# Patient Record
Sex: FEMALE | Race: WHITE | NOT HISPANIC OR LATINO | Employment: FULL TIME | URBAN - METROPOLITAN AREA
[De-identification: names, ages, dates, MRNs, and addresses within clinical notes are randomized per-mention and may not be internally consistent; named-entity substitution may affect disease eponyms.]

---

## 2017-01-13 ENCOUNTER — TELEPHONE (OUTPATIENT)
Dept: FAMILY MEDICINE | Facility: CLINIC | Age: 52
End: 2017-01-13

## 2017-01-13 NOTE — TELEPHONE ENCOUNTER
----- Message from Lesa Perez sent at 1/13/2017  9:15 AM CST -----  Contact: patient  Patient calling in regards to rescheduling her Nurse visit to check her blood pressure. She missed the appt on 1/4/17. She would like to come in on Monday after 10 am. Please advise.  Call back .  Thanks!

## 2017-01-16 ENCOUNTER — CLINICAL SUPPORT (OUTPATIENT)
Dept: FAMILY MEDICINE | Facility: CLINIC | Age: 52
End: 2017-01-16
Payer: COMMERCIAL

## 2017-01-16 ENCOUNTER — HOSPITAL ENCOUNTER (OUTPATIENT)
Dept: RADIOLOGY | Facility: CLINIC | Age: 52
Discharge: HOME | End: 2017-01-16
Attending: OBSTETRICS & GYNECOLOGY
Payer: COMMERCIAL

## 2017-01-16 ENCOUNTER — OFFICE VISIT (OUTPATIENT)
Dept: OBSTETRICS AND GYNECOLOGY | Facility: CLINIC | Age: 52
End: 2017-01-16
Payer: COMMERCIAL

## 2017-01-16 VITALS
HEIGHT: 59 IN | SYSTOLIC BLOOD PRESSURE: 126 MMHG | WEIGHT: 167.13 LBS | BODY MASS INDEX: 33.69 KG/M2 | DIASTOLIC BLOOD PRESSURE: 76 MMHG

## 2017-01-16 VITALS — SYSTOLIC BLOOD PRESSURE: 118 MMHG | DIASTOLIC BLOOD PRESSURE: 82 MMHG

## 2017-01-16 DIAGNOSIS — Z12.31 VISIT FOR SCREENING MAMMOGRAM: ICD-10-CM

## 2017-01-16 DIAGNOSIS — Z01.419 VISIT FOR GYNECOLOGIC EXAMINATION: ICD-10-CM

## 2017-01-16 DIAGNOSIS — Z12.31 VISIT FOR SCREENING MAMMOGRAM: Primary | ICD-10-CM

## 2017-01-16 DIAGNOSIS — I10 ESSENTIAL HYPERTENSION: Primary | ICD-10-CM

## 2017-01-16 PROCEDURE — 77067 SCR MAMMO BI INCL CAD: CPT | Mod: TC

## 2017-01-16 PROCEDURE — 77063 BREAST TOMOSYNTHESIS BI: CPT | Mod: 26,,, | Performed by: RADIOLOGY

## 2017-01-16 PROCEDURE — 3078F DIAST BP <80 MM HG: CPT | Mod: S$GLB,,, | Performed by: OBSTETRICS & GYNECOLOGY

## 2017-01-16 PROCEDURE — 99999 PR PBB SHADOW E&M-EST. PATIENT-LVL III: CPT | Mod: PBBFAC,,, | Performed by: OBSTETRICS & GYNECOLOGY

## 2017-01-16 PROCEDURE — 99499 UNLISTED E&M SERVICE: CPT | Mod: S$GLB,,, | Performed by: INTERNAL MEDICINE

## 2017-01-16 PROCEDURE — 77067 SCR MAMMO BI INCL CAD: CPT | Mod: 26,,, | Performed by: RADIOLOGY

## 2017-01-16 PROCEDURE — 3074F SYST BP LT 130 MM HG: CPT | Mod: S$GLB,,, | Performed by: OBSTETRICS & GYNECOLOGY

## 2017-01-16 PROCEDURE — 99396 PREV VISIT EST AGE 40-64: CPT | Mod: S$GLB,,, | Performed by: OBSTETRICS & GYNECOLOGY

## 2017-01-16 NOTE — PROGRESS NOTES
Chief Complaint   Patient presents with    Well Woman       History and Physical:  Patient's last menstrual period was 2013.       Aidee Larose is a 51 y.o.  female who presents today for her routine annual GYN exam. The patient has no Gynecology complaints today. S/p Total laparoscopic Hysterectomy with  bilateral salpingo-oophorectomy - no Vaginal Bleeding or dryness, reasured. No Hormone Replacement Therapy. No breast complaints.       Allergies: Review of patient's allergies indicates:  No Known Allergies    Past Medical History   Diagnosis Date    Hypertension     Left navicular fracture of foot     Miscarriage      x2 first trimester    PE (pulmonary embolism)      after a foot fracture in     Thyroid disease      hypothyroidism       Past Surgical History   Procedure Laterality Date    Tubal ligation       section      Pelvic laparoscopy      Laser ablation      Breast reduction      Hysterectomy  2013     SAMI BSO    Oophorectomy  2013     SAMI BSO    Belt abdominoplasty  2013    Carpal tunnel release Bilateral  and      Dr. Funes       MEDS:   Current Outpatient Prescriptions on File Prior to Visit   Medication Sig Dispense Refill    amlodipine (NORVASC) 5 MG tablet Take 1 tablet (5 mg total) by mouth once daily. 30 tablet 1    levothyroxine (SYNTHROID) 50 MCG tablet TAKE 1 TABLET (50 MCG TOTAL) BY MOUTH ONCE DAILY. 90 tablet 3    multivitamin (ONE DAILY MULTIVITAMIN) per tablet Take 1 tablet by mouth once daily.      valacyclovir (VALTREX) 1000 MG tablet Take 2 tablets (2,000 mg total) by mouth 2 (two) times daily as needed. 4 tablet 5     No current facility-administered medications on file prior to visit.        OB History      Para Term  AB TAB SAB Ectopic Multiple Living    6 3 3  3  3   3          Social History     Social History    Marital status:      Spouse name: N/A    Number of children: 3     "Years of education: N/A     Occupational History     [employer]     Social History Main Topics    Smoking status: Never Smoker    Smokeless tobacco: Never Used    Alcohol use No    Drug use: No    Sexual activity: Yes     Partners: Male     Birth control/ protection: None     Other Topics Concern    Not on file     Social History Narrative       Family History   Problem Relation Age of Onset    Kidney cancer Mother     Cancer Mother      renal cancer    Heart disease Mother      valvular heart disease (history of maureen fever)    Arthritis Sister      rheumatoid arthritis    Diabetes Brother     Hypertension Brother     Breast cancer Neg Hx     Ovarian cancer Neg Hx          Past medical and surgical history reviewed.   I have reviewed the patient's medical history in detail and updated the computerized patient record.        Review of System:   General: no chills, fever, night sweats, weight gain or weight loss  Psychological: no depression or suicidal ideation  Breasts: no new or changing breast lumps, nipple discharge or masses.  Respiratory: no cough, shortness of breath, or wheezing  Cardiovascular: no chest pain or dyspnea on exertion  Gastrointestinal: no abdominal pain, change in bowel habits, or black or bloody stools  Genito-Urinary: no incontinence, urinary frequency/urgency or vulvar/vaginal symptoms, pelvic pain or abnormal vaginal bleeding.  Musculoskeletal: no gait disturbance or muscular weakness      Physical Exam:     Visit Vitals    /76    Ht 4' 11" (1.499 m)    Wt 75.8 kg (167 lb 1.7 oz)    LMP 02/05/2013    BMI 33.75 kg/m2     Constitutional: She is oriented to person, place, and time. She appears well-developed and well-nourished. No distress.   HENT:   Head: Normocephalic and atraumatic.   Eyes: Conjunctivae and EOM are normal. No scleral icterus.   Neck: Normal range of motion. Neck supple. No tracheal deviation present.   Cardiovascular: Normal " rate.    Pulmonary/Chest: Effort normal. No respiratory distress. She exhibits no tenderness.  Breasts: are symmetrical. Well healed scars.    Right breast exhibits no inverted nipple, no mass, no nipple discharge, no skin change and no tenderness.   Left breast exhibits no inverted nipple, no mass, no nipple discharge, no skin change and no tenderness.  Abdominal: Soft. She exhibits no distension and no mass. There is no tenderness. There is no rebound and no guarding.   Genitourinary: deferred  Musculoskeletal: Normal range of motion.   Lymphadenopathy: No inguinal adenopathy present.   Neurological: She is alert and oriented to person, place, and time. Coordination normal.   Skin: Skin is warm and dry. She is not diaphoretic.   Psychiatric: She has a normal mood and affect.      Assessment:   Normal annual GYN exam  1. Visit for screening mammogram  CANCELED: Mammo Digital Screening Bilat With CAD       Plan:   PAP and pelvic not necessary unless having problems  Mammogram  Follow up in 1 year.

## 2017-01-16 NOTE — MR AVS SNAPSHOT
[facility] - OB/GYN  [address] [place] [address]  Phone: [phone]                  Aidee Larose   2017 9:00 AM   Office Visit    Description:  Female : 1965   Provider:  Ryan Hardy MD   Department:  [facility] - OB/GYN           Reason for Visit     Well Woman           Diagnoses this Visit        Comments    Visit for screening mammogram    -  Primary            To Do List           Future Appointments        Provider Department Dept Phone    2017 10:15 AM [facility], NURSE [place] - Family Medicine [phone]    2017 10:45 AM [facility] MAMMO1 [facility] - Mammography [phone]      Goals (5 Years of Data)     None      [facility]     [facility] -  Assistance  Registered nurses in the [facility] provide clinical advisement, health education, appointment booking, and other advisory services.  Call for this free service at [phone].             Medications           Message regarding Medications     Verify the changes and/or additions to your medication regime listed below are the same as discussed with your clinician today.  If any of these changes or additions are incorrect, please notify your healthcare provider.             Verify that the below list of medications is an accurate representation of the medications you are currently taking.  If none reported, the list may be blank. If incorrect, please contact your healthcare provider. Carry this list with you in case of emergency.           Current Medications     amlodipine (NORVASC) 5 MG tablet Take 1 tablet (5 mg total) by mouth once daily.    levothyroxine (SYNTHROID) 50 MCG tablet TAKE 1 TABLET (50 MCG TOTAL) BY MOUTH ONCE DAILY.    multivitamin (ONE DAILY MULTIVITAMIN) per tablet Take 1 tablet by mouth once daily.    valacyclovir (VALTREX) 1000 MG tablet Take 2 tablets (2,000 mg total) by mouth 2 (two) times daily as needed.           Clinical Reference  "Information           Vital Signs - Last Recorded  Most recent update: 1/16/2017  9:13 AM by Sri Joseph LPN    BP Ht Wt LMP BMI    126/76 4' 11" (1.499 m) 75.8 kg (167 lb 1.7 oz) 02/05/2013 33.75 kg/m2      Blood Pressure          Most Recent Value    BP  126/76      Allergies as of 1/16/2017     No Known Allergies      Immunizations Administered on Date of Encounter - 1/16/2017     None      Orders Placed During Today's Visit     Future Labs/Procedures Expected by Expires    Mammo Digital Screening Bilat With CAD  1/16/2017 3/16/2018      "

## 2017-01-16 NOTE — LETTER
January 16, 2017      Janet Hampton MD  [address] [facility] [address] [place] [address]           [facility] - OB/GYN  [address] [facility] [address]  Phone: [phone]          Patient: Aidee Larose   MR Number: [id number]   YOB: 1965   Date of Visit: 1/16/2017       Dear Dr. Janet Hampton:    Thank you for referring Aidee Larose to me for evaluation. Attached you will find relevant portions of my assessment and plan of care.    If you have questions, please do not hesitate to call me. I look forward to following Aidee Larose along with you.    Sincerely,    Ryan Hardy MD    Enclosure  CC:  No Recipients    If you would like to receive this communication electronically, please contact [email or web address] or [phone] to request more information on [employer] Link access.    For providers and/or their staff who would like to refer a patient to [facility], please contact us through our one-stop-shop provider referral line, [facility] [place], at [phone].    If you feel you have received this communication in error or would no longer like to receive these types of communications, please e-mail [email or web address]

## 2017-02-10 DIAGNOSIS — I10 ESSENTIAL HYPERTENSION: ICD-10-CM

## 2017-02-11 RX ORDER — AMLODIPINE BESYLATE 5 MG/1
TABLET ORAL
Qty: 30 TABLET | Refills: 10 | Status: SHIPPED | OUTPATIENT
Start: 2017-02-11 | End: 2018-02-03 | Stop reason: SDUPTHER

## 2017-06-01 ENCOUNTER — OFFICE VISIT (OUTPATIENT)
Dept: INTERNAL MEDICINE | Facility: CLINIC | Age: 52
End: 2017-06-01
Payer: COMMERCIAL

## 2017-06-01 VITALS
DIASTOLIC BLOOD PRESSURE: 86 MMHG | SYSTOLIC BLOOD PRESSURE: 114 MMHG | WEIGHT: 168 LBS | BODY MASS INDEX: 33.87 KG/M2 | HEIGHT: 59 IN | HEART RATE: 82 BPM | RESPIRATION RATE: 18 BRPM

## 2017-06-01 DIAGNOSIS — R07.9 CHEST PAIN, UNSPECIFIED TYPE: ICD-10-CM

## 2017-06-01 DIAGNOSIS — F41.8 SITUATIONAL ANXIETY: ICD-10-CM

## 2017-06-01 DIAGNOSIS — I10 ESSENTIAL HYPERTENSION: ICD-10-CM

## 2017-06-01 PROCEDURE — 99999 PR PBB SHADOW E&M-EST. PATIENT-LVL III: CPT | Mod: PBBFAC,,, | Performed by: INTERNAL MEDICINE

## 2017-06-01 PROCEDURE — 93010 ELECTROCARDIOGRAM REPORT: CPT | Mod: S$GLB,,, | Performed by: INTERNAL MEDICINE

## 2017-06-01 PROCEDURE — 93005 ELECTROCARDIOGRAM TRACING: CPT | Mod: S$GLB,,, | Performed by: INTERNAL MEDICINE

## 2017-06-01 PROCEDURE — 99213 OFFICE O/P EST LOW 20 MIN: CPT | Mod: S$GLB,,, | Performed by: INTERNAL MEDICINE

## 2017-06-01 RX ORDER — ESCITALOPRAM OXALATE 10 MG/1
10 TABLET ORAL DAILY
Qty: 30 TABLET | Refills: 5 | Status: SHIPPED | OUTPATIENT
Start: 2017-06-01 | End: 2018-01-15

## 2017-06-01 NOTE — PROGRESS NOTES
HISTORY OF PRESENT ILLNESS:  PtJoby is a 52 y.o. female presents with complaint of increased chest tightness, felt possibly due to increased stress in her life.  Her son was diagnosed with an illness 6 months ago.  Tightness was first noticed about that time.  Worse when dealing with his illness exacerbations.  Tightness lasts a minute or two, then eases off.  Occurs 3 times in the last 6 months.  She states there is some sweating with it.  Feels like a vise.  There is CAD early in the family, uncle had bypass in his 40s, mother had congenital valve.  B/P is fine.  Has not been on anything for anxiety.      ROS:  GENERAL: No fever, chills, fatigability or weight loss.  SKIN: No rashes, itching or changes in color or texture of skin.  HEAD: No headaches or recent head trauma.  EARS: Denies ear pain, discharge or vertigo.  NOSE: No loss of smell, no epistaxis or postnasal drip.  MOUTH & THROAT: No hoarseness or change in voice. No excessive gum bleeding.  NODES: Denies swollen glands.  CHEST: Denies MATA, cyanosis, wheezing, cough and sputum production.  CARDIOVASCULAR: Positive chest pain, PND, orthopnea or reduced exercise tolerance.  ABDOMEN: Appetite fine. No weight loss. Denies constipation, diarrhea, abdominal pain, hematemesis or blood in stool.  URINARY: No flank pain, dysuria or hematuria.  PERIPHERAL VASCULAR: No claudication or cyanosis. No edema.  MUSCULOSKELETAL: No joint stiffness or swelling. Denies back pain.  NEUROLOGIC: Denies numbness    PE:   Vitals:   Vitals:    06/01/17 1537   BP: 114/86   Pulse: 82   Resp: 18     GENERAL: no acute distress, A&Ox3, comfortable.  Female with BMI of 33   HEENT: tympanic membranes clear, nasal mucosa pink, no pharyngeal erythema or exudate  NECK: supple, no cervical lymphadenopathy, no thyromegaly; no supraclavicular nodes;   CHEST:  clear to auscultation bilaterally, no crackles or wheeze; no increased work of breathing;  CARDIOVASCULAR: regular rate and rhythm, no  rubs, murmurs or gallops.  ABDOMEN: normal bowel sounds, soft non-tender, non-distended; no palpable organomegaly;   EXT: no clubbing, cyanosis or edema.     EKG today is WNL;    ASSESSMENT/PLAN:    HTN: Continue amlodipine;    Chest pain, unspecified type  -     Exercise stress echo; Future  -     EKG 12-lead; Future    Situational anxiety  -     escitalopram oxalate (LEXAPRO) 10 MG tablet; Take 1 tablet (10 mg total) by mouth once daily.  Dispense: 30 tablet; Refill: 5      Call if condition changes or worsens.    Answers for HPI/ROS submitted by the patient on 5/31/2017   activity change: No  unexpected weight change: No  neck pain: No  hearing loss: No  rhinorrhea: No  trouble swallowing: No  eye discharge: No  visual disturbance: No  chest tightness: Yes  wheezing: No  chest pain: Yes  palpitations: No  blood in stool: No  constipation: No  vomiting: No  diarrhea: No  polydipsia: No  polyuria: No  difficulty urinating: No  hematuria: No  menstrual problem: No  dysuria: No  joint swelling: No  arthralgias: No  headaches: No  weakness: No  confusion: No  dysphoric mood: No

## 2017-06-02 ENCOUNTER — TELEPHONE (OUTPATIENT)
Dept: FAMILY MEDICINE | Facility: CLINIC | Age: 52
End: 2017-06-02

## 2017-06-02 DIAGNOSIS — R07.9 CHEST PAIN, UNSPECIFIED TYPE: Primary | ICD-10-CM

## 2017-06-02 NOTE — TELEPHONE ENCOUNTER
----- Message from Narda Roberson sent at 6/2/2017  8:54 AM CDT -----  Regarding: EKG ORDER  Please put in EKG order, thanks. Narda [phone]

## 2017-06-02 NOTE — TELEPHONE ENCOUNTER
----- Message from Narda Roberson sent at 6/2/2017  8:54 AM CDT -----  Regarding: EKG ORDER  Please put in EKG order, thanks. Narda [phone]

## 2017-06-03 PROBLEM — R07.9 CHEST PAIN: Status: ACTIVE | Noted: 2017-06-03

## 2017-06-03 PROBLEM — F41.8 SITUATIONAL ANXIETY: Status: ACTIVE | Noted: 2017-06-03

## 2017-06-07 ENCOUNTER — CLINICAL SUPPORT (OUTPATIENT)
Dept: CARDIOLOGY | Facility: CLINIC | Age: 52
End: 2017-06-07
Payer: COMMERCIAL

## 2017-06-07 DIAGNOSIS — R07.9 CHEST PAIN, UNSPECIFIED TYPE: ICD-10-CM

## 2017-06-07 LAB
DIASTOLIC DYSFUNCTION: NO
MITRAL VALVE MOBILITY: NORMAL
RETIRED EF AND QEF - SEE NOTES: 60 (ref 55–65)

## 2017-06-07 PROCEDURE — 93351 STRESS TTE COMPLETE: CPT | Mod: S$GLB,,, | Performed by: INTERNAL MEDICINE

## 2017-06-07 PROCEDURE — 93321 DOPPLER ECHO F-UP/LMTD STD: CPT | Mod: S$GLB,,, | Performed by: INTERNAL MEDICINE

## 2017-12-26 DIAGNOSIS — Z12.11 COLON CANCER SCREENING: ICD-10-CM

## 2018-01-04 ENCOUNTER — PATIENT MESSAGE (OUTPATIENT)
Dept: INTERNAL MEDICINE | Facility: CLINIC | Age: 53
End: 2018-01-04

## 2018-01-04 DIAGNOSIS — E03.9 HYPOTHYROIDISM, UNSPECIFIED TYPE: ICD-10-CM

## 2018-01-04 RX ORDER — LEVOTHYROXINE SODIUM 50 UG/1
TABLET ORAL
Qty: 30 TABLET | Refills: 0 | Status: SHIPPED | OUTPATIENT
Start: 2018-01-04 | End: 2018-02-04 | Stop reason: SDUPTHER

## 2018-01-04 NOTE — TELEPHONE ENCOUNTER
Pt requesting a refill for RX levothyroxine sent to [facility] pharmacy. Pt LOV 6/1/17 last TSH 12/17/16. Please review and advise on request via [facility]. Thank you. CLC

## 2018-01-05 ENCOUNTER — LAB VISIT (OUTPATIENT)
Dept: LAB | Facility: HOSPITAL | Age: 53
End: 2018-01-05
Attending: INTERNAL MEDICINE
Payer: COMMERCIAL

## 2018-01-05 ENCOUNTER — PATIENT MESSAGE (OUTPATIENT)
Dept: INTERNAL MEDICINE | Facility: CLINIC | Age: 53
End: 2018-01-05

## 2018-01-05 DIAGNOSIS — E03.9 HYPOTHYROIDISM, UNSPECIFIED TYPE: ICD-10-CM

## 2018-01-05 LAB — TSH SERPL DL<=0.005 MIU/L-ACNC: 1.26 UIU/ML

## 2018-01-05 PROCEDURE — 36415 COLL VENOUS BLD VENIPUNCTURE: CPT | Mod: PO

## 2018-01-05 PROCEDURE — 84443 ASSAY THYROID STIM HORMONE: CPT

## 2018-01-09 ENCOUNTER — PATIENT MESSAGE (OUTPATIENT)
Dept: INTERNAL MEDICINE | Facility: CLINIC | Age: 53
End: 2018-01-09

## 2018-01-14 NOTE — PROGRESS NOTES
HISTORY OF PRESENT ILLNESS:  Pt. is a 52 y.o. female presents for monitoring of use of lexapro/she states she never did take med, HTN, hypothyroidism.  Her TSH is WNL for her hypothyroidism, but she is due for CBC, CMP, Lipid, mammogram, colonoscopy.  She has had SAMI with BSO.  Her situational anxiety trigger has resolved somewhat.  HTN is in good control on current med.    Health Maintenance Topics with due status: Not Due       Topic Last Completion Date    TETANUS VACCINE 2016    Lipid Panel 2016    Mammogram 2017     Health Maintenance Due   Topic Date Due    Colonoscopy  2017       Lab Results   Component Value Date    WBC 7.40 2016    HGB 12.9 2016    HCT 40.2 2016     2016    CHOL 191 2016    TRIG 60 2016    HDL 61 2016    LDLCALC 118.0 2016    ALT 15 2016    AST 17 2016     2016    K 4.2 2016     2016    CREATININE 0.7 2016    BUN 15 2016    CO2 23 2016    ALBUMIN 3.7 2016    TSH 1.265 2018    INR 1.6 (H) 2006       Past Medical History:   Diagnosis Date    Hypertension     Hypothyroidism     Left navicular fracture of foot 2005    Miscarriage     x2 first trimester    PE (pulmonary embolism)     after a foot fracture in     Thyroid disease     hypothyroidism       Past Surgical History:   Procedure Laterality Date    BELT ABDOMINOPLASTY  2013    breast reduction      CARPAL TUNNEL RELEASE Bilateral  and     Dr. Funes     SECTION      HYSTERECTOMY  2013    SAMI BSO    LASER ABLATION      OOPHORECTOMY  2013    SAMI BSO    PELVIC LAPAROSCOPY      TUBAL LIGATION         Social History     Social History    Marital status:      Spouse name: N/A    Number of children: 3    Years of education: N/A     Occupational History     [email or web address]     Social History Main Topics    Smoking  status: Never Smoker    Smokeless tobacco: Never Used    Alcohol use No    Drug use: No    Sexual activity: Yes     Partners: Male     Birth control/ protection: None     Other Topics Concern    None     Social History Narrative    None       ROS:  GENERAL: No fever, chills, fatigability; no weight loss.  SKIN: No rashes, itching or changes in color or texture of skin.  HEAD: No headaches or recent head trauma.  EARS: Denies ear pain, discharge or vertigo.  NOSE: No loss of smell, no epistaxis or postnasal drip.  MOUTH & THROAT: No hoarseness or change in voice. No excessive gum bleeding.  NODES: Denies swollen glands.  CHEST: Denies MATA, cyanosis, wheezing, cough and sputum production.  CARDIOVASCULAR: Denies chest pain, PND, orthopnea or reduced exercise tolerance.  ABDOMEN: Appetite fine. No weight loss. Denies constipation, diarrhea, abdominal pain, hematemesis or blood in stool.  URINARY: No flank pain, dysuria or hematuria.  PERIPHERAL VASCULAR: No claudication or cyanosis. No edema.  MUSCULOSKELETAL: No joint stiffness or swelling. Denies back pain.  NEUROLOGIC: Denies numbness    PE:   Vitals:   Vitals:    01/15/18 1407   BP: 122/78   Pulse: 86     GENERAL: no acute distress, A&Ox3, comfortable.  Female with BMI of 35   HEENT: tympanic membranes clear, nasal mucosa pink, no pharyngeal erythema or exudate  NECK: supple, no cervical lymphadenopathy, no thyromegaly; no supraclavicular nodes;   CHEST:  clear to auscultation bilaterally, no crackles or wheeze; no increased work of breathing;  CARDIOVASCULAR: regular rate and rhythm, no rubs, murmurs or gallops.  ABDOMEN: normal bowel sounds, soft non-tender, non-distended; no palpable organomegaly;   EXT: no clubbing, cyanosis or edema.     ASSESSMENT/PLAN:    Essential hypertension: is in good control on current med; will continue;  -     CBC auto differential; Future; Expected date: 01/15/2018  -     Comprehensive metabolic panel; Future; Expected date:  01/15/2018  -     Lipid panel; Future; Expected date: 01/15/2018    Hypothyroidism: in control on current med; will continue;    Health care maintenance  -     Case request GI: COLONOSCOPY  -     Mammo Digital Screening Bilat with CAD; Future; Expected date: 01/22/2018    Other orders  -     Cancel: Mammo Digital Screening Bilat with CAD; Future; Expected date: 01/15/2018    Physical performed with Jaylen Henriquez, [id number]; agree with his findings;    Medication List with Changes/Refills   Current Medications    AMLODIPINE (NORVASC) 5 MG TABLET    TAKE 1 TABLET (5 MG TOTAL) BY MOUTH ONCE DAILY.    LEVOTHYROXINE (SYNTHROID) 50 MCG TABLET    TAKE 1 TABLET (50 MCG TOTAL) BY MOUTH ONCE DAILY.    MULTIVITAMIN (ONE DAILY MULTIVITAMIN) PER TABLET    Take 1 tablet by mouth once daily.    VALACYCLOVIR (VALTREX) 1000 MG TABLET    Take 2 tablets (2,000 mg total) by mouth 2 (two) times daily as needed.   Discontinued Medications    ESCITALOPRAM OXALATE (LEXAPRO) 10 MG TABLET    Take 1 tablet (10 mg total) by mouth once daily.     Call if condition changes or worsens.

## 2018-01-15 ENCOUNTER — OFFICE VISIT (OUTPATIENT)
Dept: INTERNAL MEDICINE | Facility: CLINIC | Age: 53
End: 2018-01-15
Payer: COMMERCIAL

## 2018-01-15 VITALS
HEART RATE: 86 BPM | DIASTOLIC BLOOD PRESSURE: 78 MMHG | OXYGEN SATURATION: 97 % | SYSTOLIC BLOOD PRESSURE: 122 MMHG | HEIGHT: 59 IN | BODY MASS INDEX: 35.82 KG/M2 | WEIGHT: 177.69 LBS

## 2018-01-15 DIAGNOSIS — I10 ESSENTIAL HYPERTENSION: Primary | ICD-10-CM

## 2018-01-15 DIAGNOSIS — E03.9 HYPOTHYROIDISM, UNSPECIFIED TYPE: ICD-10-CM

## 2018-01-15 DIAGNOSIS — Z00.00 HEALTH CARE MAINTENANCE: ICD-10-CM

## 2018-01-15 PROBLEM — F41.8 SITUATIONAL ANXIETY: Status: RESOLVED | Noted: 2017-06-03 | Resolved: 2018-01-15

## 2018-01-15 PROCEDURE — 99396 PREV VISIT EST AGE 40-64: CPT | Mod: S$GLB,,, | Performed by: INTERNAL MEDICINE

## 2018-01-15 PROCEDURE — 99999 PR PBB SHADOW E&M-EST. PATIENT-LVL III: CPT | Mod: PBBFAC,,, | Performed by: INTERNAL MEDICINE

## 2018-01-22 ENCOUNTER — LAB VISIT (OUTPATIENT)
Dept: LAB | Facility: HOSPITAL | Age: 53
End: 2018-01-22
Attending: INTERNAL MEDICINE
Payer: COMMERCIAL

## 2018-01-22 DIAGNOSIS — I10 ESSENTIAL HYPERTENSION: ICD-10-CM

## 2018-01-22 LAB
ALBUMIN SERPL BCP-MCNC: 3.8 G/DL
ALP SERPL-CCNC: 94 U/L
ALT SERPL W/O P-5'-P-CCNC: 21 U/L
ANION GAP SERPL CALC-SCNC: 8 MMOL/L
AST SERPL-CCNC: 22 U/L
BASOPHILS # BLD AUTO: 0.03 K/UL
BASOPHILS NFR BLD: 0.4 %
BILIRUB SERPL-MCNC: 0.3 MG/DL
BUN SERPL-MCNC: 14 MG/DL
CALCIUM SERPL-MCNC: 9.7 MG/DL
CHLORIDE SERPL-SCNC: 107 MMOL/L
CHOLEST SERPL-MCNC: 192 MG/DL
CHOLEST/HDLC SERPL: 3.6 {RATIO}
CO2 SERPL-SCNC: 27 MMOL/L
CREAT SERPL-MCNC: 0.7 MG/DL
DIFFERENTIAL METHOD: ABNORMAL
EOSINOPHIL # BLD AUTO: 0.1 K/UL
EOSINOPHIL NFR BLD: 1.9 %
ERYTHROCYTE [DISTWIDTH] IN BLOOD BY AUTOMATED COUNT: 13.8 %
EST. GFR  (AFRICAN AMERICAN): >60 ML/MIN/1.73 M^2
EST. GFR  (NON AFRICAN AMERICAN): >60 ML/MIN/1.73 M^2
GLUCOSE SERPL-MCNC: 107 MG/DL
HCT VFR BLD AUTO: 39.1 %
HDLC SERPL-MCNC: 53 MG/DL
HDLC SERPL: 27.6 %
HGB BLD-MCNC: 12.6 G/DL
IMM GRANULOCYTES # BLD AUTO: 0.02 K/UL
IMM GRANULOCYTES NFR BLD AUTO: 0.3 %
LDLC SERPL CALC-MCNC: 116 MG/DL
LYMPHOCYTES # BLD AUTO: 2.3 K/UL
LYMPHOCYTES NFR BLD: 30.3 %
MCH RBC QN AUTO: 27.3 PG
MCHC RBC AUTO-ENTMCNC: 32.2 G/DL
MCV RBC AUTO: 85 FL
MONOCYTES # BLD AUTO: 0.5 K/UL
MONOCYTES NFR BLD: 7.2 %
NEUTROPHILS # BLD AUTO: 4.5 K/UL
NEUTROPHILS NFR BLD: 59.9 %
NONHDLC SERPL-MCNC: 139 MG/DL
NRBC BLD-RTO: 0 /100 WBC
PLATELET # BLD AUTO: 388 K/UL
PMV BLD AUTO: 9.8 FL
POTASSIUM SERPL-SCNC: 4.2 MMOL/L
PROT SERPL-MCNC: 7.4 G/DL
RBC # BLD AUTO: 4.61 M/UL
SODIUM SERPL-SCNC: 142 MMOL/L
TRIGL SERPL-MCNC: 115 MG/DL
WBC # BLD AUTO: 7.45 K/UL

## 2018-01-22 PROCEDURE — 85025 COMPLETE CBC W/AUTO DIFF WBC: CPT

## 2018-01-22 PROCEDURE — 36415 COLL VENOUS BLD VENIPUNCTURE: CPT | Mod: PO

## 2018-01-22 PROCEDURE — 80061 LIPID PANEL: CPT

## 2018-01-22 PROCEDURE — 80053 COMPREHEN METABOLIC PANEL: CPT

## 2018-01-31 ENCOUNTER — HOSPITAL ENCOUNTER (OUTPATIENT)
Dept: RADIOLOGY | Facility: HOSPITAL | Age: 53
Discharge: HOME | End: 2018-01-31
Attending: INTERNAL MEDICINE
Payer: COMMERCIAL

## 2018-01-31 DIAGNOSIS — Z00.00 HEALTH CARE MAINTENANCE: ICD-10-CM

## 2018-01-31 DIAGNOSIS — Z12.31 VISIT FOR SCREENING MAMMOGRAM: ICD-10-CM

## 2018-01-31 PROCEDURE — 77067 SCR MAMMO BI INCL CAD: CPT | Mod: 26,,, | Performed by: RADIOLOGY

## 2018-01-31 PROCEDURE — 77063 BREAST TOMOSYNTHESIS BI: CPT | Mod: 26,,, | Performed by: RADIOLOGY

## 2018-01-31 PROCEDURE — 77067 SCR MAMMO BI INCL CAD: CPT | Mod: TC,PO

## 2018-02-03 DIAGNOSIS — I10 ESSENTIAL HYPERTENSION: ICD-10-CM

## 2018-02-03 RX ORDER — AMLODIPINE BESYLATE 5 MG/1
TABLET ORAL
Qty: 30 TABLET | Refills: 10 | Status: SHIPPED | OUTPATIENT
Start: 2018-02-03 | End: 2018-07-16 | Stop reason: SDUPTHER

## 2018-02-04 DIAGNOSIS — E03.9 HYPOTHYROIDISM, UNSPECIFIED TYPE: ICD-10-CM

## 2018-02-05 RX ORDER — LEVOTHYROXINE SODIUM 50 UG/1
TABLET ORAL
Qty: 90 TABLET | Refills: 3 | Status: SHIPPED | OUTPATIENT
Start: 2018-02-05 | End: 2018-07-16 | Stop reason: SDUPTHER

## 2018-02-08 ENCOUNTER — TELEPHONE (OUTPATIENT)
Dept: GASTROENTEROLOGY | Facility: CLINIC | Age: 53
End: 2018-02-08

## 2018-02-08 NOTE — TELEPHONE ENCOUNTER
----- Message from Nathanael Carcamo sent at 2/8/2018  8:03 AM CST -----  Contact: self   Patient want to speak with a nurse regarding rescheduling appointment please call back at [phone] (home) or [phone]

## 2018-04-09 ENCOUNTER — HOSPITAL ENCOUNTER (OUTPATIENT)
Facility: HOSPITAL | Age: 53
Discharge: HOME | End: 2018-04-09
Attending: INTERNAL MEDICINE | Admitting: INTERNAL MEDICINE
Payer: COMMERCIAL

## 2018-04-09 ENCOUNTER — SURGERY (OUTPATIENT)
Age: 53
End: 2018-04-09

## 2018-04-09 ENCOUNTER — ANESTHESIA (OUTPATIENT)
Dept: ENDOSCOPY | Facility: HOSPITAL | Age: 53
End: 2018-04-09
Payer: COMMERCIAL

## 2018-04-09 ENCOUNTER — ANESTHESIA EVENT (OUTPATIENT)
Dept: ENDOSCOPY | Facility: HOSPITAL | Age: 53
End: 2018-04-09
Payer: COMMERCIAL

## 2018-04-09 VITALS
DIASTOLIC BLOOD PRESSURE: 81 MMHG | TEMPERATURE: 98 F | RESPIRATION RATE: 16 BRPM | SYSTOLIC BLOOD PRESSURE: 128 MMHG | HEART RATE: 58 BPM | OXYGEN SATURATION: 98 % | BODY MASS INDEX: 34.27 KG/M2 | HEIGHT: 59 IN | WEIGHT: 170 LBS

## 2018-04-09 DIAGNOSIS — Z12.11 COLON CANCER SCREENING: ICD-10-CM

## 2018-04-09 PROCEDURE — 63600175 PHARM REV CODE 636 W HCPCS: Mod: PO | Performed by: NURSE ANESTHETIST, CERTIFIED REGISTERED

## 2018-04-09 PROCEDURE — D9220A PRA ANESTHESIA: Mod: ANES,,, | Performed by: ANESTHESIOLOGY

## 2018-04-09 PROCEDURE — G0121 COLON CA SCRN NOT HI RSK IND: HCPCS | Mod: PO | Performed by: INTERNAL MEDICINE

## 2018-04-09 PROCEDURE — 37000009 HC ANESTHESIA EA ADD 15 MINS: Mod: PO | Performed by: INTERNAL MEDICINE

## 2018-04-09 PROCEDURE — G0121 COLON CA SCRN NOT HI RSK IND: HCPCS | Mod: ,,, | Performed by: INTERNAL MEDICINE

## 2018-04-09 PROCEDURE — D9220A PRA ANESTHESIA: Mod: CRNA,,, | Performed by: NURSE ANESTHETIST, CERTIFIED REGISTERED

## 2018-04-09 PROCEDURE — 37000008 HC ANESTHESIA 1ST 15 MINUTES: Mod: PO | Performed by: INTERNAL MEDICINE

## 2018-04-09 RX ORDER — SODIUM CHLORIDE, SODIUM LACTATE, POTASSIUM CHLORIDE, CALCIUM CHLORIDE 600; 310; 30; 20 MG/100ML; MG/100ML; MG/100ML; MG/100ML
INJECTION, SOLUTION INTRAVENOUS CONTINUOUS
Status: DISCONTINUED | OUTPATIENT
Start: 2018-04-09 | End: 2018-04-09 | Stop reason: HOSPADM

## 2018-04-09 RX ORDER — LIDOCAINE HCL/PF 100 MG/5ML
SYRINGE (ML) INTRAVENOUS
Status: DISCONTINUED | OUTPATIENT
Start: 2018-04-09 | End: 2018-04-09

## 2018-04-09 RX ORDER — PROPOFOL 10 MG/ML
VIAL (ML) INTRAVENOUS
Status: DISCONTINUED | OUTPATIENT
Start: 2018-04-09 | End: 2018-04-09

## 2018-04-09 RX ADMIN — PROPOFOL 30 MG: 10 INJECTION, EMULSION INTRAVENOUS at 08:04

## 2018-04-09 RX ADMIN — SODIUM CHLORIDE, SODIUM LACTATE, POTASSIUM CHLORIDE, CALCIUM CHLORIDE: 600; 310; 30; 20 INJECTION, SOLUTION INTRAVENOUS at 08:04

## 2018-04-09 RX ADMIN — LIDOCAINE HYDROCHLORIDE 100 MG: 20 INJECTION, SOLUTION INTRAVENOUS at 08:04

## 2018-04-09 NOTE — TRANSFER OF CARE
"Anesthesia Transfer of Care Note    Patient: Aidee Larose    Procedure(s) Performed: Procedure(s) (LRB):  COLONOSCOPY (N/A)    Patient location: PACU    Anesthesia Type: general    Transport from OR: Transported from OR on room air with adequate spontaneous ventilation    Post pain: adequate analgesia    Post assessment: no apparent anesthetic complications    Post vital signs: stable    Level of consciousness: awake    Nausea/Vomiting: no nausea/vomiting    Complications: none    Transfer of care protocol was followed      Last vitals:   Visit Vitals  /76 (BP Location: Right arm, Patient Position: Lying)   Pulse 68   Temp 36.8 °C (98.2 °F) (Skin)   Resp 16   Ht 4' 11" (1.499 m)   Wt 77.1 kg (170 lb)   LMP 02/05/2013   SpO2 96%   Breastfeeding? No   BMI 34.34 kg/m²     "

## 2018-04-09 NOTE — PROVATION PATIENT INSTRUCTIONS
Discharge Summary/Instructions after an Endoscopic Procedure  Patient Name: Aidee Larose  Patient MRN: [id number]  Patient YOB: 1965  Monday, April 09, 2018  Jace Spangler MD  RESTRICTIONS:  During your procedure today, you received medications for sedation.  These   medications may affect your judgment, balance and coordination.  Therefore,   for 24 hours, you have the following restrictions:   - DO NOT drive a car, operate machinery, make legal/financial decisions,   sign important papers or drink alcohol.    ACTIVITY:  The following day: return to full activity including work, except no heavy   lifting, straining or running for 3 days if polyps were removed.  DIET:  Eat and drink normally unless instructed otherwise.     TREATMENT FOR COMMON SIDE EFFECTS:  - Mild abdominal pain, nausea, belching, bloating or excessive gas:  rest,   eat lightly and use a heating pad.  - Sore Throat: treat with throat lozenges and/or gargle with warm salt   water.  - Because air was used during the procedure, expelling large amounts of air   from your rectum or belching is normal.  - If a bowel prep was taken, you may not have a bowel movement for 1-3 days.    This is normal.  SYMPTOMS TO WATCH FOR AND REPORT TO YOUR PHYSICIAN:  1. Abdominal pain or bloating, other than gas cramps.  2. Chest pain.  3. Back pain.  4. Signs of infection such as: chills or fever occurring within 24 hours   after the procedure.  5. Rectal bleeding, which would show as bright red, maroon, or black stools.   (A tablespoon of blood from the rectum is not serious, especially if   hemorrhoids are present.)  6. Vomiting.  7. Weakness or dizziness.  GO DIRECTLY TO THE NEAREST EMERGENCY ROOM IF YOU HAVE ANY OF THE FOLLOWING:      Difficulty breathing              Chills and/or fever over 101 F   Persistent vomiting and/or vomiting blood   Severe abdominal pain   Severe chest pain   Black, tarry stools   Bleeding- more than one  tablespoon   Any other symptom or condition that you feel may need urgent attention  Your doctor recommends these additional instructions:  If any biopsies were taken, your doctors clinic will contact you in 1 to 2   weeks with any results.  - Discharge patient to home (ambulatory).   - Repeat colonoscopy in 10 years for screening purposes.  For questions, problems or results please call your physician - Jace Spangler MD at Work: [phone].  EMERGENCY PHONE NUMBER: [phone], LAB RESULTS: [phone]  IF A COMPLICATION OR EMERGENCY SITUATION ARISES AND YOU ARE UNABLE TO REACH   YOUR PHYSICIAN - GO DIRECTLY TO THE EMERGENCY ROOM.  ___________________________________________  Nurse Signature  ___________________________________________  Patient/Designated Responsible Party Signature  Jace Spangler MD  4/9/2018 8:51:11 AM  This report has been verified and signed electronically.

## 2018-04-09 NOTE — H&P
History & Physical - Short Stay  Gastroenterology      SUBJECTIVE:     Procedure: Colonoscopy    Chief Complaint/Indication for Procedure: Screening    PTA Medications   Medication Sig    amLODIPine (NORVASC) 5 MG tablet TAKE 1 TABLET (5 MG TOTAL) BY MOUTH ONCE DAILY.    levothyroxine (SYNTHROID) 50 MCG tablet TAKE 1 TABLET (50 MCG TOTAL) BY MOUTH ONCE DAILY.    multivitamin (ONE DAILY MULTIVITAMIN) per tablet Take 1 tablet by mouth once daily.    valacyclovir (VALTREX) 1000 MG tablet Take 2 tablets (2,000 mg total) by mouth 2 (two) times daily as needed.       Review of patient's allergies indicates:  No Known Allergies     Past Medical History:   Diagnosis Date    Hypertension     Hypothyroidism     Left navicular fracture of foot     Miscarriage     x2 first trimester    PE (pulmonary embolism)     after a foot fracture in     PONV (postoperative nausea and vomiting)     Thyroid disease     hypothyroidism     Past Surgical History:   Procedure Laterality Date    BELT ABDOMINOPLASTY  2013    breast reduction      CARPAL TUNNEL RELEASE Bilateral  and     Dr. Funes     SECTION      HYSTERECTOMY  2013    SAMI BSO    LASER ABLATION      OOPHORECTOMY  2013    SAMI BSO    PELVIC LAPAROSCOPY      TOTAL REDUCTION MAMMOPLASTY Bilateral 1992    TUBAL LIGATION       Family History   Problem Relation Age of Onset    Kidney cancer Mother     Cancer Mother      renal cancer    Heart disease Mother      valvular heart disease (history of maureen fever)    Arthritis Sister      rheumatoid arthritis    Diabetes Brother     Hypertension Brother     Breast cancer Neg Hx     Ovarian cancer Neg Hx      Social History   Substance Use Topics    Smoking status: Never Smoker    Smokeless tobacco: Never Used    Alcohol use No         OBJECTIVE:     Vital Signs (Most Recent)       Physical Exam                                                        GENERAL:   Comfortable, in no acute distress.                                 HEENT EXAM:  Nonicteric.  No adenopathy.  Oropharynx is clear.               NECK:  Supple.                                                               LUNGS:  Clear.                                                               CARDIAC:  Regular rate and rhythm.  S1, S2.  No murmur.                      ABDOMEN:  Soft, positive bowel sounds, nontender.  No hepatosplenomegaly or masses.  No rebound or guarding.                                             EXTREMITIES:  No edema.     MENTAL STATUS:  Normal, alert and oriented.      ASSESSMENT/PLAN:     Assessment: Colorectal cancer screening    Plan: Colonoscopy    Anesthesia Plan: General    ASA Grade: ASA 2 - Patient with mild systemic disease with no functional limitations    MALLAMPATI SCORE:  I (soft palate, uvula, fauces, and tonsillar pillars visible)

## 2018-04-09 NOTE — DISCHARGE SUMMARY
Discharge Note  Short Stay      SUMMARY     Admit Date: 4/9/2018    Attending Physician: Janet Hampton MD     Discharge Physician: Janet Hampton MD    Discharge Date: 4/9/2018 8:51 AM    Final Diagnosis: Health care maintenance [Z00.00]    Disposition: HOME OR SELF CARE    Patient Instructions:   Current Discharge Medication List      CONTINUE these medications which have NOT CHANGED    Details   amLODIPine (NORVASC) 5 MG tablet TAKE 1 TABLET (5 MG TOTAL) BY MOUTH ONCE DAILY.  Qty: 30 tablet, Refills: 10    Associated Diagnoses: Essential hypertension      levothyroxine (SYNTHROID) 50 MCG tablet TAKE 1 TABLET (50 MCG TOTAL) BY MOUTH ONCE DAILY.  Qty: 90 tablet, Refills: 3    Associated Diagnoses: Hypothyroidism, unspecified type      multivitamin (ONE DAILY MULTIVITAMIN) per tablet Take 1 tablet by mouth once daily.      valacyclovir (VALTREX) 1000 MG tablet Take 2 tablets (2,000 mg total) by mouth 2 (two) times daily as needed.  Qty: 4 tablet, Refills: 5             Discharge Procedure Orders (must include Diet, Follow-up, Activity)    Follow Up:  Follow up with PCP as previously scheduled  Resume routine diet.  Activity as tolerated.    No driving day of procedure.

## 2018-04-09 NOTE — ANESTHESIA POSTPROCEDURE EVALUATION
"Anesthesia Post Evaluation    Patient: Aidee Larose    Procedure(s) Performed: Procedure(s) (LRB):  COLONOSCOPY (N/A)    Final Anesthesia Type: general  Patient location during evaluation: PACU  Patient participation: Yes- Able to Participate  Level of consciousness: awake and alert  Post-procedure vital signs: reviewed and stable  Pain management: adequate  Airway patency: patent  PONV status at discharge: No PONV  Anesthetic complications: no      Cardiovascular status: blood pressure returned to baseline and hemodynamically stable  Respiratory status: unassisted  Hydration status: euvolemic  Follow-up not needed.        Visit Vitals  /76 (BP Location: Right arm, Patient Position: Lying)   Pulse 68   Temp 36.8 °C (98.2 °F) (Skin)   Resp 16   Ht 4' 11" (1.499 m)   Wt 77.1 kg (170 lb)   LMP 02/05/2013   SpO2 96%   Breastfeeding? No   BMI 34.34 kg/m²       Pain/Ruby Score: Pain Assessment Performed: Yes (4/9/2018  7:56 AM)  Presence of Pain: denies (4/9/2018  7:56 AM)  Ruby Score: 6 (4/9/2018  8:52 AM)      "

## 2018-04-09 NOTE — ANESTHESIA PREPROCEDURE EVALUATION
04/09/2018  Aidee Larose is a 53 y.o., female.    Anesthesia Evaluation    I have reviewed the Patient Summary Reports.    I have reviewed the Nursing Notes.      Review of Systems  Anesthesia Hx:  PONV after GA   Cardiovascular:   Hypertension, well controlled    Endocrine:   Hypothyroidism        Physical Exam  General:  Well nourished                 Anesthesia Plan  Type of Anesthesia, risks & benefits discussed:  Anesthesia Type:  general  Patient's Preference:   Intra-op Monitoring Plan:   Intra-op Monitoring Plan Comments:   Post Op Pain Control Plan:   Post Op Pain Control Plan Comments:   Induction:   IV  Beta Blocker:  Patient is not currently on a Beta-Blocker (No further documentation required).       Informed Consent: Patient understands risks and agrees with Anesthesia plan.  Questions answered. Anesthesia consent signed with patient.  ASA Score: 2     Day of Surgery Review of History & Physical:    H&P update referred to the surgeon.         Ready For Surgery From Anesthesia Perspective.

## 2018-04-09 NOTE — DISCHARGE INSTRUCTIONS
Recovery After Procedural Sedation (Adult)  You have been given medicine by vein to make you sleep during your surgery. This may have included both a pain medicine and sleeping medicine. Most of the effects have worn off. But you may still have some drowsiness for the next 6 to 8 hours.  Home care  Follow these guidelines when you get home:  · For the next 8 hours, you should be watched by a responsible adult. This person should make sure your condition is not getting worse.  · Don't drink any alcohol for the next 24 hours.  · Don't drive, operate dangerous machinery, or make important business or personal decisions during the next 24 hours.  Note: Your healthcare provider may tell you not to take any medicine by mouth for pain or sleep in the next 4 hours. These medicines may react with the medicines you were given in the hospital. This could cause a much stronger response than usual.  Follow-up care  Follow up with your healthcare provider if you are not alert and back to your usual level of activity within 12 hours.  When to seek medical advice  Call your healthcare provider right away if any of these occur:  · Drowsiness gets worse  · Weakness or dizziness gets worse  · Repeated vomiting  · You can't be awakened   Date Last Reviewed: 10/18/2016  © 9877-9355 [employer]. [address] [place] [address], [place] [address]. All rights reserved. This information is not intended as a substitute for professional medical care. Always follow your healthcare professional's instructions.

## 2018-04-12 RX ORDER — VALACYCLOVIR HYDROCHLORIDE 1 G/1
2000 TABLET, FILM COATED ORAL 2 TIMES DAILY PRN
Qty: 4 TABLET | Refills: 3 | Status: SHIPPED | OUTPATIENT
Start: 2018-04-12 | End: 2018-07-16 | Stop reason: SDUPTHER

## 2018-07-16 ENCOUNTER — OFFICE VISIT (OUTPATIENT)
Dept: FAMILY MEDICINE | Facility: CLINIC | Age: 53
End: 2018-07-16
Payer: COMMERCIAL

## 2018-07-16 VITALS
DIASTOLIC BLOOD PRESSURE: 81 MMHG | TEMPERATURE: 98 F | SYSTOLIC BLOOD PRESSURE: 120 MMHG | HEIGHT: 59 IN | HEART RATE: 81 BPM | OXYGEN SATURATION: 97 % | BODY MASS INDEX: 35.42 KG/M2 | WEIGHT: 175.69 LBS | RESPIRATION RATE: 18 BRPM

## 2018-07-16 DIAGNOSIS — B00.1 FEVER BLISTER: ICD-10-CM

## 2018-07-16 DIAGNOSIS — E03.9 HYPOTHYROIDISM, UNSPECIFIED TYPE: ICD-10-CM

## 2018-07-16 DIAGNOSIS — I10 ESSENTIAL HYPERTENSION: Primary | ICD-10-CM

## 2018-07-16 PROCEDURE — 99214 OFFICE O/P EST MOD 30 MIN: CPT | Mod: S$GLB,,, | Performed by: FAMILY MEDICINE

## 2018-07-16 PROCEDURE — 3008F BODY MASS INDEX DOCD: CPT | Mod: CPTII,S$GLB,, | Performed by: FAMILY MEDICINE

## 2018-07-16 PROCEDURE — 99999 PR PBB SHADOW E&M-EST. PATIENT-LVL III: CPT | Mod: PBBFAC,,, | Performed by: FAMILY MEDICINE

## 2018-07-16 PROCEDURE — 3079F DIAST BP 80-89 MM HG: CPT | Mod: CPTII,S$GLB,, | Performed by: FAMILY MEDICINE

## 2018-07-16 PROCEDURE — 3074F SYST BP LT 130 MM HG: CPT | Mod: CPTII,S$GLB,, | Performed by: FAMILY MEDICINE

## 2018-07-16 RX ORDER — LEVOTHYROXINE SODIUM 50 UG/1
TABLET ORAL
Qty: 90 TABLET | Refills: 3 | Status: SHIPPED | OUTPATIENT
Start: 2018-07-16 | End: 2019-08-26 | Stop reason: SDUPTHER

## 2018-07-16 RX ORDER — VALACYCLOVIR HYDROCHLORIDE 1 G/1
2000 TABLET, FILM COATED ORAL 2 TIMES DAILY PRN
Qty: 4 TABLET | Refills: 3 | Status: SHIPPED | OUTPATIENT
Start: 2018-07-16 | End: 2019-08-26 | Stop reason: SDUPTHER

## 2018-07-16 RX ORDER — AMLODIPINE BESYLATE 5 MG/1
5 TABLET ORAL DAILY
Qty: 90 TABLET | Refills: 3 | Status: SHIPPED | OUTPATIENT
Start: 2018-07-16 | End: 2019-08-26 | Stop reason: SDUPTHER

## 2018-07-16 NOTE — PROGRESS NOTES
"Subjective:       Patient ID: Aidee Larose is a 53 y.o. female.    Chief Complaint: Establish Care and Follow-up (hypothyroidism and hypertension)    This pt is new to me.  Pt is here for followup of chronic medical issues.  Pt is followed for HTN and hypothyroidism.  Pt is concerned about her weight gain--she states her activity level is decreased and she has much family stress (her son is ill).        Review of Systems   Constitutional: Negative for activity change, appetite change, fatigue and unexpected weight change.   Eyes: Negative for visual disturbance.   Respiratory: Negative for cough, chest tightness and shortness of breath.    Cardiovascular: Negative for chest pain, palpitations and leg swelling.   Gastrointestinal: Negative for abdominal pain, constipation, diarrhea, nausea and vomiting.   Endocrine: Negative for cold intolerance, heat intolerance and polyuria.   Genitourinary: Negative for decreased urine volume and dysuria.   Musculoskeletal: Negative for arthralgias and back pain.   Skin: Negative for rash.   Neurological: Negative for numbness and headaches.   Psychiatric/Behavioral: Positive for sleep disturbance. The patient is nervous/anxious (mild, episodic--pt does not want a medication).        Objective:       Vitals:    07/16/18 0846   BP: 120/81   BP Location: Right arm   Patient Position: Sitting   BP Method: Large (Manual)   Pulse: 81   Resp: 18   Temp: 98.4 °F (36.9 °C)   TempSrc: Oral   SpO2: 97%   Weight: 79.7 kg (175 lb 11.3 oz)   Height: 4' 11" (1.499 m)     Physical Exam   Constitutional: She is oriented to person, place, and time. She appears well-developed and well-nourished.   HENT:   Head: Normocephalic.   Eyes: Conjunctivae and EOM are normal. Pupils are equal, round, and reactive to light.   Neck: Normal range of motion. Neck supple. No thyromegaly present.   Cardiovascular: Normal rate, regular rhythm and normal heart sounds.    Pulmonary/Chest: Effort normal " and breath sounds normal.   Abdominal: Soft. Bowel sounds are normal. There is no tenderness.   Musculoskeletal: Normal range of motion. She exhibits no tenderness or deformity.   Lymphadenopathy:     She has no cervical adenopathy.   Neurological: She is alert and oriented to person, place, and time. She displays normal reflexes. No cranial nerve deficit. She exhibits normal muscle tone. Coordination normal.   Skin: Skin is warm and dry.   Psychiatric: She has a normal mood and affect. Her behavior is normal.       Assessment:       1. Fever blister    2. Essential hypertension    3. Hypothyroidism, unspecified type        Plan:       Aidee was seen today for establish care and follow-up.    Diagnoses and all orders for this visit:    Fever blister  -     valACYclovir (VALTREX) 1000 MG tablet; Take 2 tablets (2,000 mg total) by mouth 2 (two) times daily as needed.    Essential hypertension  -     amLODIPine (NORVASC) 5 MG tablet; Take 1 tablet (5 mg total) by mouth once daily.  -     Comprehensive metabolic panel; Future  -     Lipid panel; Future    Hypothyroidism, unspecified type  -     levothyroxine (SYNTHROID) 50 MCG tablet; TAKE 1 TABLET (50 MCG TOTAL) BY MOUTH ONCE DAILY.  -     TSH; Future      During this visit, I reviewed the pt's history, medications, allergies, and problem list.

## 2019-01-18 ENCOUNTER — OFFICE VISIT (OUTPATIENT)
Dept: FAMILY MEDICINE | Facility: CLINIC | Age: 54
End: 2019-01-18
Payer: COMMERCIAL

## 2019-01-18 VITALS
WEIGHT: 180.31 LBS | DIASTOLIC BLOOD PRESSURE: 88 MMHG | SYSTOLIC BLOOD PRESSURE: 123 MMHG | TEMPERATURE: 99 F | BODY MASS INDEX: 36.35 KG/M2 | HEIGHT: 59 IN | HEART RATE: 81 BPM | OXYGEN SATURATION: 95 %

## 2019-01-18 DIAGNOSIS — R07.81 RIB PAIN ON RIGHT SIDE: Primary | ICD-10-CM

## 2019-01-18 DIAGNOSIS — M94.0 COSTOCHONDRITIS: ICD-10-CM

## 2019-01-18 PROCEDURE — 3079F DIAST BP 80-89 MM HG: CPT | Mod: CPTII,S$GLB,, | Performed by: PHYSICIAN ASSISTANT

## 2019-01-18 PROCEDURE — 3079F PR MOST RECENT DIASTOLIC BLOOD PRESSURE 80-89 MM HG: ICD-10-PCS | Mod: CPTII,S$GLB,, | Performed by: PHYSICIAN ASSISTANT

## 2019-01-18 PROCEDURE — 3074F PR MOST RECENT SYSTOLIC BLOOD PRESSURE < 130 MM HG: ICD-10-PCS | Mod: CPTII,S$GLB,, | Performed by: PHYSICIAN ASSISTANT

## 2019-01-18 PROCEDURE — 99213 PR OFFICE/OUTPT VISIT, EST, LEVL III, 20-29 MIN: ICD-10-PCS | Mod: S$GLB,,, | Performed by: PHYSICIAN ASSISTANT

## 2019-01-18 PROCEDURE — 3008F PR BODY MASS INDEX (BMI) DOCUMENTED: ICD-10-PCS | Mod: CPTII,S$GLB,, | Performed by: PHYSICIAN ASSISTANT

## 2019-01-18 PROCEDURE — 3008F BODY MASS INDEX DOCD: CPT | Mod: CPTII,S$GLB,, | Performed by: PHYSICIAN ASSISTANT

## 2019-01-18 PROCEDURE — 99999 PR PBB SHADOW E&M-EST. PATIENT-LVL III: ICD-10-PCS | Mod: PBBFAC,,, | Performed by: PHYSICIAN ASSISTANT

## 2019-01-18 PROCEDURE — 3074F SYST BP LT 130 MM HG: CPT | Mod: CPTII,S$GLB,, | Performed by: PHYSICIAN ASSISTANT

## 2019-01-18 PROCEDURE — 99213 OFFICE O/P EST LOW 20 MIN: CPT | Mod: S$GLB,,, | Performed by: PHYSICIAN ASSISTANT

## 2019-01-18 PROCEDURE — 99999 PR PBB SHADOW E&M-EST. PATIENT-LVL III: CPT | Mod: PBBFAC,,, | Performed by: PHYSICIAN ASSISTANT

## 2019-01-18 RX ORDER — PROMETHAZINE HYDROCHLORIDE AND DEXTROMETHORPHAN HYDROBROMIDE 6.25; 15 MG/5ML; MG/5ML
SYRUP ORAL
Refills: 0 | COMMUNITY
Start: 2019-01-04 | End: 2019-08-26 | Stop reason: ALTCHOICE

## 2019-01-18 RX ORDER — PREDNISONE 20 MG/1
TABLET ORAL
Refills: 0 | COMMUNITY
Start: 2019-01-04 | End: 2019-08-26 | Stop reason: ALTCHOICE

## 2019-01-18 RX ORDER — ALBUTEROL SULFATE 90 UG/1
AEROSOL, METERED RESPIRATORY (INHALATION)
Refills: 0 | COMMUNITY
Start: 2019-01-04 | End: 2019-08-26 | Stop reason: ALTCHOICE

## 2019-01-18 RX ORDER — AMOXICILLIN 875 MG/1
TABLET, FILM COATED ORAL
Refills: 0 | COMMUNITY
Start: 2018-12-28 | End: 2019-08-26 | Stop reason: ALTCHOICE

## 2019-01-18 NOTE — PROGRESS NOTES
"Subjective:      Patient ID: Aidee Larose is a 53 y.o. female.    Chief Complaint: Follow-up (uri~ER 1/15/19 [facility] )    Patient is new to me, here today for ER follow up  Initially went to ED on 1/15/19 for R sided rib pain, UA and CXR performed  Patient denies any trauma/fall; when it first occured she was sweeping and bent over and coughed at the same time, and a sharp pain occured  Diagnosed with cough, costochondritis and UTI (2wks prior to ED had URI)  Patient discharged with Keflex, Norco, Flexeril, tessalon perles and spirometer  Patient reports symptoms have improved since ED 3 days ago, medication is helping, she was feeling better yesterday but today has pain again  Using spirometer every hour when awake      Review of Systems   Constitutional: Negative for chills, diaphoresis and fever.   Respiratory: Positive for cough (occasional, dry, slowly improving). Negative for shortness of breath and wheezing.    Cardiovascular: Negative for chest pain and palpitations.   Gastrointestinal: Negative for abdominal pain, constipation, diarrhea, nausea and vomiting.   Genitourinary: Positive for dysuria (occasional burning at end of urination). Negative for frequency, hematuria and urgency.   Musculoskeletal:        R sided rib pain   Skin: Negative for color change and rash.   Neurological: Negative for dizziness, light-headedness and headaches.   Hematological: Does not bruise/bleed easily.       Objective:   /88 (BP Location: Left arm, Patient Position: Sitting, BP Method: Large (Manual))   Pulse 81   Temp 98.6 °F (37 °C) (Oral)   Ht 4' 11" (1.499 m)   Wt 81.8 kg (180 lb 5.4 oz)   LMP 02/05/2013   SpO2 95%   BMI 36.42 kg/m²   Physical Exam   Constitutional: She appears well-developed and well-nourished. She does not appear ill. No distress.   HENT:   Head: Normocephalic and atraumatic.   Cardiovascular: Normal rate, regular rhythm and normal heart sounds.   No murmur " heard.  Pulmonary/Chest: Effort normal and breath sounds normal. No respiratory distress. She has no decreased breath sounds.       Abdominal: Soft. Normal appearance and bowel sounds are normal. There is no tenderness. There is no CVA tenderness.   Skin: Skin is warm, dry and intact. No bruising and no rash noted. No erythema.   Psychiatric: She has a normal mood and affect. Her speech is normal and behavior is normal. Thought content normal.     Assessment:      1. Rib pain on right side    2. Costochondritis       Plan:   Rib pain on right side    Costochondritis    Continue treatment plan  Also recommend warm heat and epsom soaks  Stressed importance of spirometer use and reviewed concerning signs/symptoms that would warrant immediate eval (worsening pain or cough, sudden shortness of breath, fever)  If not improved significantly by Monday, recommend return to clinic   Patient expresses understanding and agrees with treatment plan.

## 2019-01-22 ENCOUNTER — PATIENT MESSAGE (OUTPATIENT)
Dept: FAMILY MEDICINE | Facility: CLINIC | Age: 54
End: 2019-01-22

## 2019-01-23 RX ORDER — CYCLOBENZAPRINE HCL 10 MG
10 TABLET ORAL 3 TIMES DAILY PRN
Qty: 30 TABLET | Refills: 0 | Status: SHIPPED | OUTPATIENT
Start: 2019-01-23 | End: 2019-02-02

## 2019-01-23 RX ORDER — BENZONATATE 100 MG/1
100 CAPSULE ORAL 3 TIMES DAILY PRN
Qty: 20 CAPSULE | Refills: 0 | OUTPATIENT
Start: 2019-01-23 | End: 2019-02-02

## 2019-02-07 ENCOUNTER — PATIENT MESSAGE (OUTPATIENT)
Dept: FAMILY MEDICINE | Facility: CLINIC | Age: 54
End: 2019-02-07

## 2019-02-07 DIAGNOSIS — Z12.39 BREAST CANCER SCREENING: Primary | ICD-10-CM

## 2019-02-13 ENCOUNTER — PATIENT MESSAGE (OUTPATIENT)
Dept: FAMILY MEDICINE | Facility: CLINIC | Age: 54
End: 2019-02-13

## 2019-03-04 ENCOUNTER — HOSPITAL ENCOUNTER (OUTPATIENT)
Dept: RADIOLOGY | Facility: HOSPITAL | Age: 54
Discharge: HOME | End: 2019-03-04
Attending: FAMILY MEDICINE
Payer: COMMERCIAL

## 2019-03-04 DIAGNOSIS — Z12.39 BREAST CANCER SCREENING: ICD-10-CM

## 2019-03-04 PROCEDURE — 77067 SCR MAMMO BI INCL CAD: CPT | Mod: TC,PO

## 2019-03-04 PROCEDURE — 77067 MAMMO DIGITAL SCREENING BILAT WITH TOMOSYNTHESIS_CAD: ICD-10-PCS | Mod: 26,,, | Performed by: RADIOLOGY

## 2019-03-04 PROCEDURE — 77067 SCR MAMMO BI INCL CAD: CPT | Mod: 26,,, | Performed by: RADIOLOGY

## 2019-03-04 PROCEDURE — 77063 BREAST TOMOSYNTHESIS BI: CPT | Mod: 26,,, | Performed by: RADIOLOGY

## 2019-03-04 PROCEDURE — 77063 MAMMO DIGITAL SCREENING BILAT WITH TOMOSYNTHESIS_CAD: ICD-10-PCS | Mod: 26,,, | Performed by: RADIOLOGY

## 2019-08-24 DIAGNOSIS — I10 ESSENTIAL HYPERTENSION: ICD-10-CM

## 2019-08-24 DIAGNOSIS — E03.9 HYPOTHYROIDISM, UNSPECIFIED TYPE: ICD-10-CM

## 2019-08-26 ENCOUNTER — OFFICE VISIT (OUTPATIENT)
Dept: FAMILY MEDICINE | Facility: CLINIC | Age: 54
End: 2019-08-26
Payer: COMMERCIAL

## 2019-08-26 VITALS
HEIGHT: 59 IN | SYSTOLIC BLOOD PRESSURE: 126 MMHG | BODY MASS INDEX: 36.49 KG/M2 | DIASTOLIC BLOOD PRESSURE: 86 MMHG | WEIGHT: 181 LBS | HEART RATE: 96 BPM

## 2019-08-26 DIAGNOSIS — B00.1 FEVER BLISTER: ICD-10-CM

## 2019-08-26 DIAGNOSIS — F51.01 PRIMARY INSOMNIA: ICD-10-CM

## 2019-08-26 DIAGNOSIS — I10 ESSENTIAL HYPERTENSION: Primary | ICD-10-CM

## 2019-08-26 DIAGNOSIS — E03.9 HYPOTHYROIDISM, UNSPECIFIED TYPE: ICD-10-CM

## 2019-08-26 PROCEDURE — 3008F BODY MASS INDEX DOCD: CPT | Mod: CPTII,S$GLB,, | Performed by: FAMILY MEDICINE

## 2019-08-26 PROCEDURE — 3079F PR MOST RECENT DIASTOLIC BLOOD PRESSURE 80-89 MM HG: ICD-10-PCS | Mod: CPTII,S$GLB,, | Performed by: FAMILY MEDICINE

## 2019-08-26 PROCEDURE — 99999 PR PBB SHADOW E&M-EST. PATIENT-LVL III: CPT | Mod: PBBFAC,,, | Performed by: FAMILY MEDICINE

## 2019-08-26 PROCEDURE — 99999 PR PBB SHADOW E&M-EST. PATIENT-LVL III: ICD-10-PCS | Mod: PBBFAC,,, | Performed by: FAMILY MEDICINE

## 2019-08-26 PROCEDURE — 3074F PR MOST RECENT SYSTOLIC BLOOD PRESSURE < 130 MM HG: ICD-10-PCS | Mod: CPTII,S$GLB,, | Performed by: FAMILY MEDICINE

## 2019-08-26 PROCEDURE — 3008F PR BODY MASS INDEX (BMI) DOCUMENTED: ICD-10-PCS | Mod: CPTII,S$GLB,, | Performed by: FAMILY MEDICINE

## 2019-08-26 PROCEDURE — 99214 OFFICE O/P EST MOD 30 MIN: CPT | Mod: S$GLB,,, | Performed by: FAMILY MEDICINE

## 2019-08-26 PROCEDURE — 3079F DIAST BP 80-89 MM HG: CPT | Mod: CPTII,S$GLB,, | Performed by: FAMILY MEDICINE

## 2019-08-26 PROCEDURE — 99214 PR OFFICE/OUTPT VISIT, EST, LEVL IV, 30-39 MIN: ICD-10-PCS | Mod: S$GLB,,, | Performed by: FAMILY MEDICINE

## 2019-08-26 PROCEDURE — 3074F SYST BP LT 130 MM HG: CPT | Mod: CPTII,S$GLB,, | Performed by: FAMILY MEDICINE

## 2019-08-26 RX ORDER — VALACYCLOVIR HYDROCHLORIDE 1 G/1
2000 TABLET, FILM COATED ORAL 2 TIMES DAILY PRN
Qty: 4 TABLET | Refills: 3 | Status: SHIPPED | OUTPATIENT
Start: 2019-08-26 | End: 2021-06-11 | Stop reason: SDUPTHER

## 2019-08-26 RX ORDER — AMLODIPINE BESYLATE 5 MG/1
TABLET ORAL
Qty: 90 TABLET | Refills: 3 | Status: SHIPPED | OUTPATIENT
Start: 2019-08-26 | End: 2020-05-29 | Stop reason: SDUPTHER

## 2019-08-26 RX ORDER — LEVOTHYROXINE SODIUM 50 UG/1
TABLET ORAL
Qty: 90 TABLET | Refills: 3 | Status: SHIPPED | OUTPATIENT
Start: 2019-08-26 | End: 2020-05-29 | Stop reason: SDUPTHER

## 2019-08-26 RX ORDER — AMLODIPINE BESYLATE 5 MG/1
5 TABLET ORAL DAILY
Qty: 90 TABLET | Refills: 3 | Status: SHIPPED | OUTPATIENT
Start: 2019-08-26 | End: 2021-06-11 | Stop reason: SDUPTHER

## 2019-08-26 RX ORDER — LEVOTHYROXINE SODIUM 50 UG/1
TABLET ORAL
Qty: 90 TABLET | Refills: 3 | Status: SHIPPED | OUTPATIENT
Start: 2019-08-26 | End: 2021-06-11 | Stop reason: SDUPTHER

## 2019-08-26 NOTE — PROGRESS NOTES
"Subjective:       Patient ID: Aidee Larose is a 54 y.o. female.    Chief Complaint: Annual Exam    Pt is known to me.  Pt is here for followup of chronic medical issues.  The pt had an episode of bronchitis and costochondritis in January.  She is now feeling good with no acute complaints.      Review of Systems   Constitutional: Negative for activity change, appetite change, fatigue and unexpected weight change.   Eyes: Negative for visual disturbance.   Respiratory: Negative for cough, chest tightness and shortness of breath.    Cardiovascular: Negative for chest pain, palpitations and leg swelling.   Gastrointestinal: Negative for abdominal pain, constipation, diarrhea, nausea and vomiting.   Endocrine: Negative for cold intolerance, heat intolerance and polyuria.   Genitourinary: Negative for decreased urine volume and dysuria.   Musculoskeletal: Negative for arthralgias and back pain.   Skin: Negative for rash.   Neurological: Negative for numbness and headaches.   Psychiatric/Behavioral: Positive for sleep disturbance (does not want prescription).       Objective:       Vitals:    08/26/19 1436   BP: 126/86   BP Location: Right arm   Patient Position: Sitting   BP Method: Large (Manual)   Pulse: 96   Weight: 82.1 kg (181 lb)   Height: 4' 11" (1.499 m)     Physical Exam   Constitutional: She is oriented to person, place, and time. She appears well-developed and well-nourished.   Pt is obese   HENT:   Head: Normocephalic.   Eyes: Pupils are equal, round, and reactive to light. Conjunctivae and EOM are normal.   Neck: Normal range of motion. Neck supple. No thyromegaly present.   Cardiovascular: Normal rate, regular rhythm and normal heart sounds.   Pulmonary/Chest: Effort normal and breath sounds normal.   Abdominal: Soft. Bowel sounds are normal. There is no tenderness.   Musculoskeletal: Normal range of motion. She exhibits no tenderness or deformity.   Lymphadenopathy:     She has no cervical " adenopathy.   Neurological: She is alert and oriented to person, place, and time. She displays normal reflexes. No cranial nerve deficit. She exhibits normal muscle tone. Coordination normal.   Skin: Skin is warm and dry.   Psychiatric: She has a normal mood and affect. Her behavior is normal.       Assessment:       1. Essential hypertension    2. Fever blister    3. Hypothyroidism, unspecified type    4. Primary insomnia        Plan:       Aidee was seen today for annual exam.    Diagnoses and all orders for this visit:    Essential hypertension  -     amLODIPine (NORVASC) 5 MG tablet; Take 1 tablet (5 mg total) by mouth once daily.    Fever blister  -     valACYclovir (VALTREX) 1000 MG tablet; Take 2 tablets (2,000 mg total) by mouth 2 (two) times daily as needed.    Hypothyroidism, unspecified type  -     levothyroxine (SYNTHROID) 50 MCG tablet; TAKE 1 TABLET (50 MCG TOTAL) BY MOUTH ONCE DAILY.    Primary insomnia      During this visit, I reviewed the pt's history, medications, allergies, and problem list.    Pt to [facility] for sleep/anxiety

## 2019-08-27 ENCOUNTER — PATIENT MESSAGE (OUTPATIENT)
Dept: FAMILY MEDICINE | Facility: CLINIC | Age: 54
End: 2019-08-27

## 2019-08-27 ENCOUNTER — LAB VISIT (OUTPATIENT)
Dept: LAB | Facility: HOSPITAL | Age: 54
End: 2019-08-27
Attending: FAMILY MEDICINE
Payer: COMMERCIAL

## 2019-08-27 DIAGNOSIS — I10 ESSENTIAL HYPERTENSION: ICD-10-CM

## 2019-08-27 DIAGNOSIS — E03.9 HYPOTHYROIDISM, UNSPECIFIED TYPE: ICD-10-CM

## 2019-08-27 LAB
ALBUMIN SERPL BCP-MCNC: 4 G/DL (ref 3.5–5.2)
ALP SERPL-CCNC: 98 U/L (ref 55–135)
ALT SERPL W/O P-5'-P-CCNC: 21 U/L (ref 10–44)
ANION GAP SERPL CALC-SCNC: 12 MMOL/L (ref 8–16)
AST SERPL-CCNC: 22 U/L (ref 10–40)
BILIRUB SERPL-MCNC: 0.3 MG/DL (ref 0.1–1)
BUN SERPL-MCNC: 14 MG/DL (ref 6–20)
CALCIUM SERPL-MCNC: 9.9 MG/DL (ref 8.7–10.5)
CHLORIDE SERPL-SCNC: 107 MMOL/L (ref 95–110)
CHOLEST SERPL-MCNC: 199 MG/DL (ref 120–199)
CHOLEST/HDLC SERPL: 3.3 {RATIO} (ref 2–5)
CO2 SERPL-SCNC: 23 MMOL/L (ref 23–29)
CREAT SERPL-MCNC: 0.8 MG/DL (ref 0.5–1.4)
EST. GFR  (AFRICAN AMERICAN): >60 ML/MIN/1.73 M^2
EST. GFR  (NON AFRICAN AMERICAN): >60 ML/MIN/1.73 M^2
GLUCOSE SERPL-MCNC: 114 MG/DL (ref 70–110)
HDLC SERPL-MCNC: 60 MG/DL (ref 40–75)
HDLC SERPL: 30.2 % (ref 20–50)
LDLC SERPL CALC-MCNC: 118.2 MG/DL (ref 63–159)
NONHDLC SERPL-MCNC: 139 MG/DL
POTASSIUM SERPL-SCNC: 4.3 MMOL/L (ref 3.5–5.1)
PROT SERPL-MCNC: 7.5 G/DL (ref 6–8.4)
SODIUM SERPL-SCNC: 142 MMOL/L (ref 136–145)
TRIGL SERPL-MCNC: 104 MG/DL (ref 30–150)
TSH SERPL DL<=0.005 MIU/L-ACNC: 1.15 UIU/ML (ref 0.4–4)

## 2019-08-27 PROCEDURE — 36415 COLL VENOUS BLD VENIPUNCTURE: CPT | Mod: PO

## 2019-08-27 PROCEDURE — 84443 ASSAY THYROID STIM HORMONE: CPT

## 2019-08-27 PROCEDURE — 80061 LIPID PANEL: CPT

## 2019-08-27 PROCEDURE — 80053 COMPREHEN METABOLIC PANEL: CPT

## 2020-03-15 ENCOUNTER — NURSE TRIAGE (OUTPATIENT)
Dept: ADMINISTRATIVE | Facility: CLINIC | Age: 55
End: 2020-03-15

## 2020-03-15 RX ORDER — OSELTAMIVIR PHOSPHATE 75 MG/1
75 CAPSULE ORAL 2 TIMES DAILY
Qty: 10 CAPSULE | Refills: 0 | Status: SHIPPED | OUTPATIENT
Start: 2020-03-15 | End: 2020-03-20

## 2020-03-16 NOTE — TELEPHONE ENCOUNTER
Pt calling, flu-like symptoms and was in close proximity to her son who was dx with flu shortly after. Per triage protocol, called in tamiflu for pt and advised her to call back if symptoms worsen so that we can set up [facility] visit for her. She verbalized understanding and thanks.    ---  This [facility] utilizing the 2019 [facility] [place] flu protocol. A prescription for Tamiflu has been called in for this patient under Dr. Myers. Please see the encounter for any further information.   ---    Reason for Disposition   [1] Probable influenza (fever) with no complications AND [2] NOT HIGH RISK    Additional Information   Negative: Severe difficulty breathing (e.g., struggling for each breath, speaks in single words)   Negative: Bluish (or gray) lips or face now   Negative: Shock suspected (e.g., cold/pale/clammy skin, too weak to stand, low BP, rapid pulse)   Negative: Sounds like a life-threatening emergency to the triager   Negative: Chest pain  (Exception: MILD central chest pain, present only when coughing)   Negative: [1] Headache AND [2] stiff neck (can't touch chin to chest)   Negative: Fever > 104 F (40 C)   Negative: [1] Difficulty breathing AND [2] not severe AND [3] not from stuffy nose (e.g., not relieved by cleaning out the nose)   Negative: Patient sounds very sick or weak to the triager   Negative: [1] Fever > 101 F (38.3 C) AND [2] age > 60   Negative: [1] Fever > 100.0 F (37.8 C) AND [2] bedridden (e.g., nursing home patient, CVA, chronic illness, recovering from surgery)   Negative: [1] Fever > 100.0 F (37.8 C) AND [2] diabetes mellitus or weak immune system (e.g., HIV positive, cancer chemo, splenectomy, chronic steroids)    Protocols used: INFLUENZA - SEASONAL-A-

## 2020-05-05 ENCOUNTER — PATIENT MESSAGE (OUTPATIENT)
Dept: ADMINISTRATIVE | Facility: HOSPITAL | Age: 55
End: 2020-05-05

## 2020-05-05 DIAGNOSIS — Z12.39 SCREENING FOR BREAST CANCER: Primary | ICD-10-CM

## 2020-05-06 ENCOUNTER — PATIENT MESSAGE (OUTPATIENT)
Dept: ADMINISTRATIVE | Facility: HOSPITAL | Age: 55
End: 2020-05-06

## 2020-05-07 ENCOUNTER — HOSPITAL ENCOUNTER (OUTPATIENT)
Dept: RADIOLOGY | Facility: HOSPITAL | Age: 55
Discharge: HOME | End: 2020-05-07
Attending: FAMILY MEDICINE
Payer: COMMERCIAL

## 2020-05-07 DIAGNOSIS — Z12.31 ENCOUNTER FOR SCREENING MAMMOGRAM FOR BREAST CANCER: ICD-10-CM

## 2020-05-07 PROCEDURE — 77063 MAMMO DIGITAL SCREENING BILAT WITH TOMOSYNTHESIS_CAD: ICD-10-PCS | Mod: 26,,, | Performed by: RADIOLOGY

## 2020-05-07 PROCEDURE — 77067 SCR MAMMO BI INCL CAD: CPT | Mod: 26,,, | Performed by: RADIOLOGY

## 2020-05-07 PROCEDURE — 77067 SCR MAMMO BI INCL CAD: CPT | Mod: TC,PO

## 2020-05-07 PROCEDURE — 77067 MAMMO DIGITAL SCREENING BILAT WITH TOMOSYNTHESIS_CAD: ICD-10-PCS | Mod: 26,,, | Performed by: RADIOLOGY

## 2020-05-07 PROCEDURE — 77063 BREAST TOMOSYNTHESIS BI: CPT | Mod: 26,,, | Performed by: RADIOLOGY

## 2020-05-29 ENCOUNTER — OFFICE VISIT (OUTPATIENT)
Dept: FAMILY MEDICINE | Facility: CLINIC | Age: 55
End: 2020-05-29
Payer: COMMERCIAL

## 2020-05-29 VITALS
HEART RATE: 89 BPM | HEIGHT: 59 IN | DIASTOLIC BLOOD PRESSURE: 88 MMHG | TEMPERATURE: 99 F | WEIGHT: 179.88 LBS | BODY MASS INDEX: 36.26 KG/M2 | OXYGEN SATURATION: 97 % | SYSTOLIC BLOOD PRESSURE: 116 MMHG

## 2020-05-29 DIAGNOSIS — E03.9 ACQUIRED HYPOTHYROIDISM: ICD-10-CM

## 2020-05-29 DIAGNOSIS — I10 ESSENTIAL HYPERTENSION: Primary | ICD-10-CM

## 2020-05-29 PROCEDURE — 3079F PR MOST RECENT DIASTOLIC BLOOD PRESSURE 80-89 MM HG: ICD-10-PCS | Mod: CPTII,S$GLB,, | Performed by: FAMILY MEDICINE

## 2020-05-29 PROCEDURE — 99999 PR PBB SHADOW E&M-EST. PATIENT-LVL III: CPT | Mod: PBBFAC,,, | Performed by: FAMILY MEDICINE

## 2020-05-29 PROCEDURE — 99999 PR PBB SHADOW E&M-EST. PATIENT-LVL III: ICD-10-PCS | Mod: PBBFAC,,, | Performed by: FAMILY MEDICINE

## 2020-05-29 PROCEDURE — 3074F SYST BP LT 130 MM HG: CPT | Mod: CPTII,S$GLB,, | Performed by: FAMILY MEDICINE

## 2020-05-29 PROCEDURE — 99214 OFFICE O/P EST MOD 30 MIN: CPT | Mod: S$GLB,,, | Performed by: FAMILY MEDICINE

## 2020-05-29 PROCEDURE — 3008F BODY MASS INDEX DOCD: CPT | Mod: CPTII,S$GLB,, | Performed by: FAMILY MEDICINE

## 2020-05-29 PROCEDURE — 99214 PR OFFICE/OUTPT VISIT, EST, LEVL IV, 30-39 MIN: ICD-10-PCS | Mod: S$GLB,,, | Performed by: FAMILY MEDICINE

## 2020-05-29 PROCEDURE — 3079F DIAST BP 80-89 MM HG: CPT | Mod: CPTII,S$GLB,, | Performed by: FAMILY MEDICINE

## 2020-05-29 PROCEDURE — 3008F PR BODY MASS INDEX (BMI) DOCUMENTED: ICD-10-PCS | Mod: CPTII,S$GLB,, | Performed by: FAMILY MEDICINE

## 2020-05-29 PROCEDURE — 3074F PR MOST RECENT SYSTOLIC BLOOD PRESSURE < 130 MM HG: ICD-10-PCS | Mod: CPTII,S$GLB,, | Performed by: FAMILY MEDICINE

## 2020-05-29 RX ORDER — AMLODIPINE BESYLATE 5 MG/1
5 TABLET ORAL DAILY
Qty: 90 TABLET | Refills: 3 | Status: SHIPPED | OUTPATIENT
Start: 2020-05-29 | End: 2021-06-11 | Stop reason: SDUPTHER

## 2020-05-29 RX ORDER — LEVOTHYROXINE SODIUM 50 UG/1
TABLET ORAL
Qty: 90 TABLET | Refills: 3 | Status: SHIPPED | OUTPATIENT
Start: 2020-05-29 | End: 2021-08-17

## 2020-05-29 NOTE — PROGRESS NOTES
"Subjective:       Patient ID: Aidee Larose is a 55 y.o. female.    Chief Complaint: Hypertension    Pt is known to me.   Pt is here for followup of chronic medical issues.  The pt was able to work during the pandemic.  The pt is feeling well and has no acute complaints.      Review of Systems   Constitutional: Negative for activity change, appetite change, fatigue and unexpected weight change.   Eyes: Negative for visual disturbance.   Respiratory: Negative for cough, chest tightness and shortness of breath.    Cardiovascular: Negative for chest pain, palpitations and leg swelling.   Gastrointestinal: Negative for abdominal pain, constipation, diarrhea, nausea and vomiting.   Endocrine: Negative for cold intolerance, heat intolerance and polyuria.   Genitourinary: Negative for decreased urine volume and dysuria.   Musculoskeletal: Negative for arthralgias and back pain.   Skin: Negative for rash.   Neurological: Negative for numbness and headaches.   Psychiatric/Behavioral: Negative for sleep disturbance. The patient is nervous/anxious (mild, episodic).        Objective:       Vitals:    05/29/20 0808   BP: 116/88   Pulse: 89   Temp: 98.5 °F (36.9 °C)   TempSrc: Oral   SpO2: 97%   Weight: 81.6 kg (179 lb 14.3 oz)   Height: 4' 11" (1.499 m)     Physical Exam   Constitutional: She is oriented to person, place, and time. She appears well-developed and well-nourished.   HENT:   Head: Normocephalic.   Eyes: Pupils are equal, round, and reactive to light. Conjunctivae and EOM are normal.   Neck: Normal range of motion. Neck supple. No thyromegaly present.   Cardiovascular: Normal rate, regular rhythm and normal heart sounds.   Pulmonary/Chest: Effort normal and breath sounds normal.   Abdominal: Soft. Bowel sounds are normal. There is no tenderness.   Musculoskeletal: Normal range of motion. She exhibits no tenderness or deformity.   Lymphadenopathy:     She has no cervical adenopathy.   Neurological: She is " alert and oriented to person, place, and time. She displays normal reflexes. No cranial nerve deficit. She exhibits normal muscle tone. Coordination normal.   Skin: Skin is warm and dry.   Psychiatric: She has a normal mood and affect. Her behavior is normal.       Assessment:       1. Essential hypertension    2. Acquired hypothyroidism        Plan:       Aidee was seen today for hypertension.    Diagnoses and all orders for this visit:    Essential hypertension  -     amLODIPine (NORVASC) 5 MG tablet; Take 1 tablet (5 mg total) by mouth once daily.  -     Comprehensive metabolic panel; Future  -     Lipid Panel; Future    Acquired hypothyroidism  -     levothyroxine (SYNTHROID) 50 MCG tablet; TAKE 1 TABLET BY MOUTH EVERY DAY  -     TSH; Future      During this visit, I reviewed the pt's history, medications, allergies, and problem list.

## 2021-04-29 ENCOUNTER — PATIENT MESSAGE (OUTPATIENT)
Dept: RESEARCH | Facility: HOSPITAL | Age: 56
End: 2021-04-29

## 2021-05-28 ENCOUNTER — PATIENT MESSAGE (OUTPATIENT)
Dept: FAMILY MEDICINE | Facility: CLINIC | Age: 56
End: 2021-05-28

## 2021-05-28 DIAGNOSIS — Z12.31 ENCOUNTER FOR SCREENING MAMMOGRAM FOR BREAST CANCER: Primary | ICD-10-CM

## 2021-06-01 ENCOUNTER — PATIENT MESSAGE (OUTPATIENT)
Dept: FAMILY MEDICINE | Facility: CLINIC | Age: 56
End: 2021-06-01

## 2021-06-04 ENCOUNTER — PATIENT MESSAGE (OUTPATIENT)
Dept: ADMINISTRATIVE | Facility: HOSPITAL | Age: 56
End: 2021-06-04

## 2021-06-04 ENCOUNTER — PATIENT OUTREACH (OUTPATIENT)
Dept: ADMINISTRATIVE | Facility: HOSPITAL | Age: 56
End: 2021-06-04

## 2021-06-07 ENCOUNTER — TELEPHONE (OUTPATIENT)
Dept: FAMILY MEDICINE | Facility: CLINIC | Age: 56
End: 2021-06-07

## 2021-06-10 ENCOUNTER — HOSPITAL ENCOUNTER (OUTPATIENT)
Dept: RADIOLOGY | Facility: HOSPITAL | Age: 56
Discharge: HOME | End: 2021-06-10
Attending: FAMILY MEDICINE
Payer: COMMERCIAL

## 2021-06-10 DIAGNOSIS — Z12.31 ENCOUNTER FOR SCREENING MAMMOGRAM FOR BREAST CANCER: ICD-10-CM

## 2021-06-10 PROCEDURE — 77063 BREAST TOMOSYNTHESIS BI: CPT | Mod: 26,,, | Performed by: RADIOLOGY

## 2021-06-10 PROCEDURE — 77067 MAMMO DIGITAL SCREENING BILAT WITH TOMO: ICD-10-PCS | Mod: 26,,, | Performed by: RADIOLOGY

## 2021-06-10 PROCEDURE — 77067 SCR MAMMO BI INCL CAD: CPT | Mod: TC,PO

## 2021-06-10 PROCEDURE — 77063 MAMMO DIGITAL SCREENING BILAT WITH TOMO: ICD-10-PCS | Mod: 26,,, | Performed by: RADIOLOGY

## 2021-06-10 PROCEDURE — 77067 SCR MAMMO BI INCL CAD: CPT | Mod: 26,,, | Performed by: RADIOLOGY

## 2021-06-11 ENCOUNTER — OFFICE VISIT (OUTPATIENT)
Dept: FAMILY MEDICINE | Facility: CLINIC | Age: 56
End: 2021-06-11
Payer: COMMERCIAL

## 2021-06-11 ENCOUNTER — LAB VISIT (OUTPATIENT)
Dept: LAB | Facility: HOSPITAL | Age: 56
End: 2021-06-11
Attending: FAMILY MEDICINE
Payer: COMMERCIAL

## 2021-06-11 VITALS
TEMPERATURE: 98 F | HEIGHT: 59 IN | SYSTOLIC BLOOD PRESSURE: 110 MMHG | OXYGEN SATURATION: 98 % | WEIGHT: 179.88 LBS | BODY MASS INDEX: 36.26 KG/M2 | HEART RATE: 73 BPM | DIASTOLIC BLOOD PRESSURE: 70 MMHG

## 2021-06-11 DIAGNOSIS — I10 ESSENTIAL HYPERTENSION: ICD-10-CM

## 2021-06-11 DIAGNOSIS — B00.1 FEVER BLISTER: ICD-10-CM

## 2021-06-11 DIAGNOSIS — Z00.00 WELLNESS EXAMINATION: Primary | ICD-10-CM

## 2021-06-11 DIAGNOSIS — Z00.00 WELLNESS EXAMINATION: ICD-10-CM

## 2021-06-11 LAB
ALBUMIN SERPL BCP-MCNC: 4 G/DL (ref 3.5–5.2)
ALP SERPL-CCNC: 84 U/L (ref 55–135)
ALT SERPL W/O P-5'-P-CCNC: 17 U/L (ref 10–44)
ANION GAP SERPL CALC-SCNC: 8 MMOL/L (ref 8–16)
AST SERPL-CCNC: 20 U/L (ref 10–40)
BASOPHILS # BLD AUTO: 0.02 K/UL (ref 0–0.2)
BASOPHILS NFR BLD: 0.3 % (ref 0–1.9)
BILIRUB SERPL-MCNC: 0.4 MG/DL (ref 0.1–1)
BUN SERPL-MCNC: 12 MG/DL (ref 6–20)
CALCIUM SERPL-MCNC: 9.6 MG/DL (ref 8.7–10.5)
CHLORIDE SERPL-SCNC: 110 MMOL/L (ref 95–110)
CHOLEST SERPL-MCNC: 215 MG/DL (ref 120–199)
CHOLEST/HDLC SERPL: 4.1 {RATIO} (ref 2–5)
CO2 SERPL-SCNC: 23 MMOL/L (ref 23–29)
CREAT SERPL-MCNC: 0.7 MG/DL (ref 0.5–1.4)
DIFFERENTIAL METHOD: ABNORMAL
EOSINOPHIL # BLD AUTO: 0.1 K/UL (ref 0–0.5)
EOSINOPHIL NFR BLD: 1.3 % (ref 0–8)
ERYTHROCYTE [DISTWIDTH] IN BLOOD BY AUTOMATED COUNT: 14.4 % (ref 11.5–14.5)
EST. GFR  (AFRICAN AMERICAN): >60 ML/MIN/1.73 M^2
EST. GFR  (NON AFRICAN AMERICAN): >60 ML/MIN/1.73 M^2
ESTIMATED AVG GLUCOSE: 123 MG/DL (ref 68–131)
GLUCOSE SERPL-MCNC: 100 MG/DL (ref 70–110)
HBA1C MFR BLD: 5.9 % (ref 4–5.6)
HCT VFR BLD AUTO: 41.4 % (ref 37–48.5)
HDLC SERPL-MCNC: 53 MG/DL (ref 40–75)
HDLC SERPL: 24.7 % (ref 20–50)
HGB BLD-MCNC: 12.6 G/DL (ref 12–16)
IMM GRANULOCYTES # BLD AUTO: 0.02 K/UL (ref 0–0.04)
IMM GRANULOCYTES NFR BLD AUTO: 0.3 % (ref 0–0.5)
LDLC SERPL CALC-MCNC: 134.8 MG/DL (ref 63–159)
LYMPHOCYTES # BLD AUTO: 2 K/UL (ref 1–4.8)
LYMPHOCYTES NFR BLD: 28.8 % (ref 18–48)
MCH RBC QN AUTO: 26.4 PG (ref 27–31)
MCHC RBC AUTO-ENTMCNC: 30.4 G/DL (ref 32–36)
MCV RBC AUTO: 87 FL (ref 82–98)
MONOCYTES # BLD AUTO: 0.5 K/UL (ref 0.3–1)
MONOCYTES NFR BLD: 7.1 % (ref 4–15)
NEUTROPHILS # BLD AUTO: 4.4 K/UL (ref 1.8–7.7)
NEUTROPHILS NFR BLD: 62.2 % (ref 38–73)
NONHDLC SERPL-MCNC: 162 MG/DL
NRBC BLD-RTO: 0 /100 WBC
PLATELET # BLD AUTO: 413 K/UL (ref 150–450)
PMV BLD AUTO: 10 FL (ref 9.2–12.9)
POTASSIUM SERPL-SCNC: 4.2 MMOL/L (ref 3.5–5.1)
PROT SERPL-MCNC: 7.3 G/DL (ref 6–8.4)
RBC # BLD AUTO: 4.77 M/UL (ref 4–5.4)
SODIUM SERPL-SCNC: 141 MMOL/L (ref 136–145)
TRIGL SERPL-MCNC: 136 MG/DL (ref 30–150)
WBC # BLD AUTO: 7.02 K/UL (ref 3.9–12.7)

## 2021-06-11 PROCEDURE — 99396 PR PREVENTIVE VISIT,EST,40-64: ICD-10-PCS | Mod: S$GLB,,, | Performed by: FAMILY MEDICINE

## 2021-06-11 PROCEDURE — 1126F AMNT PAIN NOTED NONE PRSNT: CPT | Mod: S$GLB,,, | Performed by: FAMILY MEDICINE

## 2021-06-11 PROCEDURE — 80053 COMPREHEN METABOLIC PANEL: CPT | Performed by: FAMILY MEDICINE

## 2021-06-11 PROCEDURE — 1126F PR PAIN SEVERITY QUANTIFIED, NO PAIN PRESENT: ICD-10-PCS | Mod: S$GLB,,, | Performed by: FAMILY MEDICINE

## 2021-06-11 PROCEDURE — 83036 HEMOGLOBIN GLYCOSYLATED A1C: CPT | Performed by: FAMILY MEDICINE

## 2021-06-11 PROCEDURE — 99999 PR PBB SHADOW E&M-EST. PATIENT-LVL III: ICD-10-PCS | Mod: PBBFAC,,, | Performed by: FAMILY MEDICINE

## 2021-06-11 PROCEDURE — 99999 PR PBB SHADOW E&M-EST. PATIENT-LVL III: CPT | Mod: PBBFAC,,, | Performed by: FAMILY MEDICINE

## 2021-06-11 PROCEDURE — 3008F BODY MASS INDEX DOCD: CPT | Mod: CPTII,S$GLB,, | Performed by: FAMILY MEDICINE

## 2021-06-11 PROCEDURE — 84443 ASSAY THYROID STIM HORMONE: CPT | Performed by: FAMILY MEDICINE

## 2021-06-11 PROCEDURE — 85025 COMPLETE CBC W/AUTO DIFF WBC: CPT | Performed by: FAMILY MEDICINE

## 2021-06-11 PROCEDURE — 80061 LIPID PANEL: CPT | Performed by: FAMILY MEDICINE

## 2021-06-11 PROCEDURE — 3008F PR BODY MASS INDEX (BMI) DOCUMENTED: ICD-10-PCS | Mod: CPTII,S$GLB,, | Performed by: FAMILY MEDICINE

## 2021-06-11 PROCEDURE — 86803 HEPATITIS C AB TEST: CPT | Performed by: FAMILY MEDICINE

## 2021-06-11 PROCEDURE — 99396 PREV VISIT EST AGE 40-64: CPT | Mod: S$GLB,,, | Performed by: FAMILY MEDICINE

## 2021-06-11 PROCEDURE — 36415 COLL VENOUS BLD VENIPUNCTURE: CPT | Mod: PO | Performed by: FAMILY MEDICINE

## 2021-06-11 RX ORDER — VALACYCLOVIR HYDROCHLORIDE 1 G/1
2000 TABLET, FILM COATED ORAL 2 TIMES DAILY PRN
Qty: 4 TABLET | Refills: 11 | Status: SHIPPED | OUTPATIENT
Start: 2021-06-11 | End: 2022-06-10 | Stop reason: SDUPTHER

## 2021-06-11 RX ORDER — AMLODIPINE BESYLATE 5 MG/1
5 TABLET ORAL DAILY
Qty: 90 TABLET | Refills: 3 | Status: SHIPPED | OUTPATIENT
Start: 2021-06-11 | End: 2022-06-10 | Stop reason: SDUPTHER

## 2021-06-12 LAB — TSH SERPL DL<=0.005 MIU/L-ACNC: 0.74 UIU/ML (ref 0.4–4)

## 2021-06-14 LAB — HCV AB SERPL QL IA: NEGATIVE

## 2021-06-16 ENCOUNTER — PATIENT MESSAGE (OUTPATIENT)
Dept: FAMILY MEDICINE | Facility: CLINIC | Age: 56
End: 2021-06-16

## 2021-06-16 DIAGNOSIS — R73.03 PREDIABETES: Primary | ICD-10-CM

## 2021-08-15 DIAGNOSIS — E03.9 ACQUIRED HYPOTHYROIDISM: ICD-10-CM

## 2021-08-17 RX ORDER — LEVOTHYROXINE SODIUM 50 UG/1
TABLET ORAL
Qty: 90 TABLET | Refills: 3 | Status: SHIPPED | OUTPATIENT
Start: 2021-08-17 | End: 2022-06-10 | Stop reason: SDUPTHER

## 2021-09-27 ENCOUNTER — PATIENT MESSAGE (OUTPATIENT)
Dept: FAMILY MEDICINE | Facility: CLINIC | Age: 56
End: 2021-09-27

## 2022-06-10 ENCOUNTER — OFFICE VISIT (OUTPATIENT)
Dept: FAMILY MEDICINE | Facility: CLINIC | Age: 57
End: 2022-06-10
Payer: COMMERCIAL

## 2022-06-10 ENCOUNTER — LAB VISIT (OUTPATIENT)
Dept: LAB | Facility: HOSPITAL | Age: 57
End: 2022-06-10
Attending: FAMILY MEDICINE
Payer: COMMERCIAL

## 2022-06-10 VITALS
BODY MASS INDEX: 36.93 KG/M2 | SYSTOLIC BLOOD PRESSURE: 108 MMHG | WEIGHT: 183.19 LBS | HEART RATE: 76 BPM | OXYGEN SATURATION: 95 % | HEIGHT: 59 IN | DIASTOLIC BLOOD PRESSURE: 76 MMHG

## 2022-06-10 DIAGNOSIS — R73.03 PRE-DIABETES: ICD-10-CM

## 2022-06-10 DIAGNOSIS — N95.1 VAGINAL DRYNESS, MENOPAUSAL: ICD-10-CM

## 2022-06-10 DIAGNOSIS — E03.9 ACQUIRED HYPOTHYROIDISM: ICD-10-CM

## 2022-06-10 DIAGNOSIS — B00.1 FEVER BLISTER: ICD-10-CM

## 2022-06-10 DIAGNOSIS — Z00.00 WELLNESS EXAMINATION: ICD-10-CM

## 2022-06-10 DIAGNOSIS — I10 ESSENTIAL HYPERTENSION: ICD-10-CM

## 2022-06-10 DIAGNOSIS — Z12.31 ENCOUNTER FOR SCREENING MAMMOGRAM FOR MALIGNANT NEOPLASM OF BREAST: ICD-10-CM

## 2022-06-10 DIAGNOSIS — Z00.00 WELLNESS EXAMINATION: Primary | ICD-10-CM

## 2022-06-10 LAB
ALBUMIN SERPL BCP-MCNC: 4.3 G/DL (ref 3.5–5.2)
ALP SERPL-CCNC: 82 U/L (ref 55–135)
ALT SERPL W/O P-5'-P-CCNC: 23 U/L (ref 10–44)
ANION GAP SERPL CALC-SCNC: 13 MMOL/L (ref 8–16)
AST SERPL-CCNC: 20 U/L (ref 10–40)
BILIRUB SERPL-MCNC: 0.3 MG/DL (ref 0.1–1)
BUN SERPL-MCNC: 15 MG/DL (ref 6–20)
CALCIUM SERPL-MCNC: 9.9 MG/DL (ref 8.7–10.5)
CHLORIDE SERPL-SCNC: 107 MMOL/L (ref 95–110)
CO2 SERPL-SCNC: 23 MMOL/L (ref 23–29)
CREAT SERPL-MCNC: 0.8 MG/DL (ref 0.5–1.4)
EST. GFR  (AFRICAN AMERICAN): >60 ML/MIN/1.73 M^2
EST. GFR  (NON AFRICAN AMERICAN): >60 ML/MIN/1.73 M^2
ESTIMATED AVG GLUCOSE: 120 MG/DL (ref 68–131)
GLUCOSE SERPL-MCNC: 115 MG/DL (ref 70–110)
HBA1C MFR BLD: 5.8 % (ref 4–5.6)
POTASSIUM SERPL-SCNC: 4.6 MMOL/L (ref 3.5–5.1)
PROT SERPL-MCNC: 7.3 G/DL (ref 6–8.4)
SODIUM SERPL-SCNC: 143 MMOL/L (ref 136–145)
T4 FREE SERPL-MCNC: 1.03 NG/DL (ref 0.71–1.51)
TSH SERPL DL<=0.005 MIU/L-ACNC: 1.02 UIU/ML (ref 0.4–4)

## 2022-06-10 PROCEDURE — 3074F SYST BP LT 130 MM HG: CPT | Mod: CPTII,S$GLB,, | Performed by: FAMILY MEDICINE

## 2022-06-10 PROCEDURE — 3078F PR MOST RECENT DIASTOLIC BLOOD PRESSURE < 80 MM HG: ICD-10-PCS | Mod: CPTII,S$GLB,, | Performed by: FAMILY MEDICINE

## 2022-06-10 PROCEDURE — 3008F PR BODY MASS INDEX (BMI) DOCUMENTED: ICD-10-PCS | Mod: CPTII,S$GLB,, | Performed by: FAMILY MEDICINE

## 2022-06-10 PROCEDURE — 36415 COLL VENOUS BLD VENIPUNCTURE: CPT | Mod: PO | Performed by: FAMILY MEDICINE

## 2022-06-10 PROCEDURE — 1159F MED LIST DOCD IN RCRD: CPT | Mod: CPTII,S$GLB,, | Performed by: FAMILY MEDICINE

## 2022-06-10 PROCEDURE — 84443 ASSAY THYROID STIM HORMONE: CPT | Performed by: FAMILY MEDICINE

## 2022-06-10 PROCEDURE — 3074F PR MOST RECENT SYSTOLIC BLOOD PRESSURE < 130 MM HG: ICD-10-PCS | Mod: CPTII,S$GLB,, | Performed by: FAMILY MEDICINE

## 2022-06-10 PROCEDURE — 99999 PR PBB SHADOW E&M-EST. PATIENT-LVL III: CPT | Mod: PBBFAC,,, | Performed by: FAMILY MEDICINE

## 2022-06-10 PROCEDURE — 84439 ASSAY OF FREE THYROXINE: CPT | Performed by: FAMILY MEDICINE

## 2022-06-10 PROCEDURE — 1159F PR MEDICATION LIST DOCUMENTED IN MEDICAL RECORD: ICD-10-PCS | Mod: CPTII,S$GLB,, | Performed by: FAMILY MEDICINE

## 2022-06-10 PROCEDURE — 80053 COMPREHEN METABOLIC PANEL: CPT | Performed by: FAMILY MEDICINE

## 2022-06-10 PROCEDURE — 99396 PREV VISIT EST AGE 40-64: CPT | Mod: S$GLB,,, | Performed by: FAMILY MEDICINE

## 2022-06-10 PROCEDURE — 99999 PR PBB SHADOW E&M-EST. PATIENT-LVL III: ICD-10-PCS | Mod: PBBFAC,,, | Performed by: FAMILY MEDICINE

## 2022-06-10 PROCEDURE — 83036 HEMOGLOBIN GLYCOSYLATED A1C: CPT | Performed by: FAMILY MEDICINE

## 2022-06-10 PROCEDURE — 1160F RVW MEDS BY RX/DR IN RCRD: CPT | Mod: CPTII,S$GLB,, | Performed by: FAMILY MEDICINE

## 2022-06-10 PROCEDURE — 3008F BODY MASS INDEX DOCD: CPT | Mod: CPTII,S$GLB,, | Performed by: FAMILY MEDICINE

## 2022-06-10 PROCEDURE — 99396 PR PREVENTIVE VISIT,EST,40-64: ICD-10-PCS | Mod: S$GLB,,, | Performed by: FAMILY MEDICINE

## 2022-06-10 PROCEDURE — 1160F PR REVIEW ALL MEDS BY PRESCRIBER/CLIN PHARMACIST DOCUMENTED: ICD-10-PCS | Mod: CPTII,S$GLB,, | Performed by: FAMILY MEDICINE

## 2022-06-10 PROCEDURE — 3078F DIAST BP <80 MM HG: CPT | Mod: CPTII,S$GLB,, | Performed by: FAMILY MEDICINE

## 2022-06-10 RX ORDER — AMLODIPINE BESYLATE 5 MG/1
5 TABLET ORAL DAILY
Qty: 90 TABLET | Refills: 3 | Status: SHIPPED | OUTPATIENT
Start: 2022-06-10 | End: 2023-08-30 | Stop reason: SDUPTHER

## 2022-06-10 RX ORDER — ESTRADIOL 0.1 MG/G
2 CREAM VAGINAL
Qty: 42.5 G | Refills: 5 | Status: SHIPPED | OUTPATIENT
Start: 2022-06-13 | End: 2023-08-30

## 2022-06-10 RX ORDER — VALACYCLOVIR HYDROCHLORIDE 1 G/1
2000 TABLET, FILM COATED ORAL 2 TIMES DAILY PRN
Qty: 4 TABLET | Refills: 11 | Status: SHIPPED | OUTPATIENT
Start: 2022-06-10 | End: 2023-05-05 | Stop reason: SDUPTHER

## 2022-06-10 RX ORDER — LEVOTHYROXINE SODIUM 50 UG/1
50 TABLET ORAL DAILY
Qty: 90 TABLET | Refills: 3 | Status: SHIPPED | OUTPATIENT
Start: 2022-06-10 | End: 2023-09-07

## 2022-06-10 NOTE — PROGRESS NOTES
"Subjective:       Patient ID: Aidee Larose is a 57 y.o. female.    Chief Complaint: Annual Exam    Pt is known to me.  The pt presents for annual wellness exam.   The pt is doing well.  She is having some vaginal dryness.  She is 10 years s/p SAMI/BSO.    The pt is concerned about her weight.  She and her  have had success with WW and are planning to restart it.    Review of Systems   Constitutional: Negative for activity change, appetite change, fatigue and unexpected weight change.   Eyes: Negative for visual disturbance.   Respiratory: Negative for cough, chest tightness and shortness of breath.    Cardiovascular: Negative for chest pain, palpitations and leg swelling.   Gastrointestinal: Negative for abdominal pain, constipation, diarrhea, nausea and vomiting.   Endocrine: Negative for cold intolerance, heat intolerance and polyuria.   Genitourinary: Negative for decreased urine volume and dysuria.   Musculoskeletal: Negative for arthralgias, back pain and neck pain.   Skin: Negative for rash.   Neurological: Negative for numbness and headaches.   Psychiatric/Behavioral: Negative.        Objective:       Vitals:    06/10/22 0804   BP: 108/76   Pulse: 76   SpO2: 95%   Weight: 83.1 kg (183 lb 3.2 oz)   Height: 4' 11" (1.499 m)     Physical Exam  Vitals and nursing note reviewed.   Constitutional:       Appearance: She is well-developed. She is obese.   HENT:      Head: Normocephalic.   Eyes:      Conjunctiva/sclera: Conjunctivae normal.      Pupils: Pupils are equal, round, and reactive to light.   Neck:      Thyroid: No thyromegaly.   Cardiovascular:      Rate and Rhythm: Normal rate and regular rhythm.      Heart sounds: Normal heart sounds.   Pulmonary:      Effort: Pulmonary effort is normal.      Breath sounds: Normal breath sounds.   Abdominal:      General: Bowel sounds are normal.      Palpations: Abdomen is soft.      Tenderness: There is no abdominal tenderness.   Musculoskeletal:    "      General: No tenderness or deformity. Normal range of motion.      Cervical back: Normal range of motion and neck supple.   Lymphadenopathy:      Cervical: No cervical adenopathy.   Skin:     General: Skin is warm and dry.   Neurological:      General: No focal deficit present.      Mental Status: She is alert and oriented to person, place, and time.      Cranial Nerves: No cranial nerve deficit.      Motor: No abnormal muscle tone.      Coordination: Coordination normal.      Deep Tendon Reflexes: Reflexes normal.   Psychiatric:         Mood and Affect: Mood normal.         Behavior: Behavior normal.         Assessment:       1. Wellness examination    2. Acquired hypothyroidism    3. Fever blister    4. Essential hypertension    5. Vaginal dryness, menopausal    6. Encounter for screening mammogram for malignant neoplasm of breast    7. Pre-diabetes        Plan:       Aidee was seen today for annual exam.    Diagnoses and all orders for this visit:    Wellness examination  -     Comprehensive Metabolic Panel; Future  -     Hemoglobin A1C; Future    Acquired hypothyroidism  -     levothyroxine (SYNTHROID) 50 MCG tablet; Take 1 tablet (50 mcg total) by mouth once daily.  -     TSH; Future  -     T4, Free; Future    Fever blister  -     valACYclovir (VALTREX) 1000 MG tablet; Take 2 tablets (2,000 mg total) by mouth 2 (two) times daily as needed (fever blister).    Essential hypertension  -     amLODIPine (NORVASC) 5 MG tablet; Take 1 tablet (5 mg total) by mouth once daily.    Vaginal dryness, menopausal  -     estradioL (ESTRACE) 0.01 % (0.1 mg/gram) vaginal cream; Place 2 g vaginally twice a week.    Encounter for screening mammogram for malignant neoplasm of breast  -     Mammo Digital Screening Bilat w/ Fabián; Future    Pre-diabetes  -     Hemoglobin A1C; Future      During this visit, I reviewed the pt's history, medications, allergies, and problem list.

## 2022-06-22 ENCOUNTER — PATIENT MESSAGE (OUTPATIENT)
Dept: FAMILY MEDICINE | Facility: CLINIC | Age: 57
End: 2022-06-22
Payer: COMMERCIAL

## 2022-07-14 ENCOUNTER — PATIENT MESSAGE (OUTPATIENT)
Dept: FAMILY MEDICINE | Facility: CLINIC | Age: 57
End: 2022-07-14
Payer: COMMERCIAL

## 2022-07-28 ENCOUNTER — HOSPITAL ENCOUNTER (OUTPATIENT)
Dept: RADIOLOGY | Facility: HOSPITAL | Age: 57
Discharge: HOME | End: 2022-07-28
Attending: FAMILY MEDICINE
Payer: COMMERCIAL

## 2022-07-28 DIAGNOSIS — Z12.31 ENCOUNTER FOR SCREENING MAMMOGRAM FOR MALIGNANT NEOPLASM OF BREAST: ICD-10-CM

## 2022-07-28 PROCEDURE — 77067 SCR MAMMO BI INCL CAD: CPT | Mod: TC,PO

## 2022-07-28 PROCEDURE — 77067 MAMMO DIGITAL SCREENING BILAT WITH TOMO: ICD-10-PCS | Mod: 26,,, | Performed by: RADIOLOGY

## 2022-07-28 PROCEDURE — 77063 BREAST TOMOSYNTHESIS BI: CPT | Mod: TC,PO

## 2022-07-28 PROCEDURE — 77063 BREAST TOMOSYNTHESIS BI: CPT | Mod: 26,,, | Performed by: RADIOLOGY

## 2022-07-28 PROCEDURE — 77063 MAMMO DIGITAL SCREENING BILAT WITH TOMO: ICD-10-PCS | Mod: 26,,, | Performed by: RADIOLOGY

## 2022-07-28 PROCEDURE — 77067 SCR MAMMO BI INCL CAD: CPT | Mod: 26,,, | Performed by: RADIOLOGY

## 2022-09-07 ENCOUNTER — OFFICE VISIT (OUTPATIENT)
Dept: OTOLARYNGOLOGY | Facility: CLINIC | Age: 57
End: 2022-09-07
Payer: COMMERCIAL

## 2022-09-07 VITALS — BODY MASS INDEX: 36.89 KG/M2 | TEMPERATURE: 99 F | HEIGHT: 59 IN | WEIGHT: 183 LBS

## 2022-09-07 DIAGNOSIS — H65.02 NON-RECURRENT ACUTE SEROUS OTITIS MEDIA OF LEFT EAR: Primary | ICD-10-CM

## 2022-09-07 DIAGNOSIS — H61.23 BILATERAL IMPACTED CERUMEN: ICD-10-CM

## 2022-09-07 DIAGNOSIS — H69.92 DYSFUNCTION OF LEFT EUSTACHIAN TUBE: ICD-10-CM

## 2022-09-07 PROCEDURE — 1160F PR REVIEW ALL MEDS BY PRESCRIBER/CLIN PHARMACIST DOCUMENTED: ICD-10-PCS | Mod: CPTII,S$GLB,, | Performed by: NURSE PRACTITIONER

## 2022-09-07 PROCEDURE — 1159F PR MEDICATION LIST DOCUMENTED IN MEDICAL RECORD: ICD-10-PCS | Mod: CPTII,S$GLB,, | Performed by: NURSE PRACTITIONER

## 2022-09-07 PROCEDURE — 1159F MED LIST DOCD IN RCRD: CPT | Mod: CPTII,S$GLB,, | Performed by: NURSE PRACTITIONER

## 2022-09-07 PROCEDURE — 99999 PR PBB SHADOW E&M-EST. PATIENT-LVL III: CPT | Mod: PBBFAC,,, | Performed by: NURSE PRACTITIONER

## 2022-09-07 PROCEDURE — 69210 REMOVE IMPACTED EAR WAX UNI: CPT | Mod: S$GLB,,, | Performed by: NURSE PRACTITIONER

## 2022-09-07 PROCEDURE — 3044F HG A1C LEVEL LT 7.0%: CPT | Mod: CPTII,S$GLB,, | Performed by: NURSE PRACTITIONER

## 2022-09-07 PROCEDURE — 3008F BODY MASS INDEX DOCD: CPT | Mod: CPTII,S$GLB,, | Performed by: NURSE PRACTITIONER

## 2022-09-07 PROCEDURE — 1160F RVW MEDS BY RX/DR IN RCRD: CPT | Mod: CPTII,S$GLB,, | Performed by: NURSE PRACTITIONER

## 2022-09-07 PROCEDURE — 99213 PR OFFICE/OUTPT VISIT, EST, LEVL III, 20-29 MIN: ICD-10-PCS | Mod: 25,S$GLB,, | Performed by: NURSE PRACTITIONER

## 2022-09-07 PROCEDURE — 99999 PR PBB SHADOW E&M-EST. PATIENT-LVL III: ICD-10-PCS | Mod: PBBFAC,,, | Performed by: NURSE PRACTITIONER

## 2022-09-07 PROCEDURE — 69210 PR REMOVAL IMPACTED CERUMEN REQUIRING INSTRUMENTATION, UNILATERAL: ICD-10-PCS | Mod: S$GLB,,, | Performed by: NURSE PRACTITIONER

## 2022-09-07 PROCEDURE — 99213 OFFICE O/P EST LOW 20 MIN: CPT | Mod: 25,S$GLB,, | Performed by: NURSE PRACTITIONER

## 2022-09-07 PROCEDURE — 3044F PR MOST RECENT HEMOGLOBIN A1C LEVEL <7.0%: ICD-10-PCS | Mod: CPTII,S$GLB,, | Performed by: NURSE PRACTITIONER

## 2022-09-07 PROCEDURE — 3008F PR BODY MASS INDEX (BMI) DOCUMENTED: ICD-10-PCS | Mod: CPTII,S$GLB,, | Performed by: NURSE PRACTITIONER

## 2022-09-07 RX ORDER — AZELASTINE 1 MG/ML
1 SPRAY, METERED NASAL 2 TIMES DAILY
Qty: 30 ML | Refills: 12 | Status: SHIPPED | OUTPATIENT
Start: 2022-09-07 | End: 2023-08-30

## 2022-09-07 RX ORDER — FLUTICASONE PROPIONATE 50 MCG
1 SPRAY, SUSPENSION (ML) NASAL 2 TIMES DAILY
Qty: 16 G | Refills: 12 | Status: SHIPPED | OUTPATIENT
Start: 2022-09-07 | End: 2023-08-30

## 2022-09-07 NOTE — PATIENT INSTRUCTIONS
"You have fluid behind your ear drum.    Antibiotics are only effective at resolving infection; however fluid can sometimes remain for 4-12 weeks.    There are only two ways to get rid of fluid stuck behind the ear drum:    (1) The conservative approach -- nasal sprays twice daily (Flonase & Astelin), nasal valsalva/popping, and time (sometimes several weeks).     (2) The invasive approach -- your ear drum is pierced with a sharp tool and the fluid is suctioned out by one of our ear surgeons. This procedure is done in the office and provides instant relief; however there is the risk that the ear drum may not heal properly or that the fluid may return requiring repeating the procedure or putting a drainage tube in your ear drum.     DIFFERENT TYPES OF "ENT-APPROVED" NASAL SPRAYS:  Flonase / fluticasone / Nasacort / Rhinocort (steroid spray) is best for stuffy, pressure, fullness.  Astelin / azelastine (antihistamine spray) is best for itchy, drippy, sneezy.    Use as directed, spraying 1-2 times in each nostril each day. It may take 2-3 days to 2-3 weeks to begin seeing improvement. This medication needs to be taken consistently to see results.     Nasal spray instructions:  Blow nose first gently to clean. Keep chin level with the floor (do not tilt head forward or back). Using the opposite hand (example: right hand for left nostril, left hand for right nostril) insert nasal spray taking caution to direct it AWAY from the middle wall inside the nose (septum) to avoid irritating nasal septum which could cause nosebleed.  Do not tilt spray up but rather flat and out along the roof of your mouth to spray. Angle the tip of the spray out slightly toward the direction of the ears; then spray. Do not take quick vigorous sniff but rather slow gentle inhalation while waiting for medication to absorb into nasal passages. Then administer second spray in same way.     Overall, this is a well-tolerated medication with low side " "effects. The benefit of nasal steroids as opposed to oral steroids is that the nasal steroid spray works primarily in the nose. Common side effects can include: headache, nasal dryness, minor nose bleed.  Rare side effects may include:  septal perforation, elevation in eye pressure, dry eyes, change in smell, allergic reaction.  Notify your provider if you have any concerns or experience these symptoms.     EUSTACHIAN TUBE INSTRUCTIONS:  Nasal valsalva:  Pinch nose and with closed mouth try to "pop" air into ears through the back of the nose. Attempt this several times a day. The more popping you have, the more likely the fluid will resolve.     Nasal saline rinse kit (use Neti pot or Selatra sinus rinse kit) -- Rinse your sinuses one to two times daily to reduce the allergen burden in your nose. Use sterile water (boiled tap water which has cooled) or distilled bottled water. Add 1/4 teaspoon sea salt and a pinch of baking soda or a mixture packet from the maker of your sinus rinse kit.  Rinse through both sides of nose to cleanse sinus and nasal passages, bending forward with head tilted down. Keep your mouth open, without holding your breath. Squeeze bottle gently until solution starts draining from the opposite nasal passage. After bottle is empty, blow nose very gently, without pinching nose completely, to avoid pressure on eardrums.  There are useful YouTube videos that show demonstration of how to do these properly.     Ponaris Nasal Emollient is used for the relief of: nasal congestion due to colds, nasal irritation, allergy exacerbations, nasal crusting. Specifically prepared iodized organic oils of pine, eucalyptus, peppermint, cajeput, and cottonseed. To order Ponaris: ask your pharmacist to order it for you or we carry it in our pharmacy downstairs on the first floor.     When flying:  Take Sudafed 60 mg 1 hour prior to flying. Afrin nasal spray 30 minutes before take-off. Chew gum/pop ears gently when " "taking off and coming down. Drink water. Use "Airplanes" or [employer]ent's "Flight Mates" ear plugs designed for flying (available on Amazon).     "

## 2022-09-07 NOTE — PROGRESS NOTES
Subjective:       Patient ID: Aidee Larose is a 57 y.o. female.    Chief Complaint: left ear feels blocked and stuffy and makes a popping sound    HPI  Patient is new to ENT (last saw me 8 years ago), self referred for left aural congestion. Patient reports left ear clogged with muffled hearing X 2 weeks. Using Debrox and irrigation at home, then went to [facility] [place] where she was given antibiotic ear gtts. Better now but still crackles/pops and feels movement in ear at times. No otalgia or otorrhea. Flew a couple of weeks ago and scheduled to fly again in 2 weeks.     Review of Systems   Constitutional: Negative.    HENT:  Positive for sore throat.    Eyes: Negative.    Respiratory: Negative.     Cardiovascular:  Positive for chest pain.   Gastrointestinal: Negative.    Endocrine: Negative.    Genitourinary: Negative.    Musculoskeletal: Negative.    Integumentary:  Negative.   Allergic/Immunologic: Negative.    Neurological: Negative.    Hematological: Negative.    Psychiatric/Behavioral:  Positive for sleep disturbance. The patient is nervous/anxious.        Objective:      Physical Exam  Vitals and nursing note reviewed.   Constitutional:       General: She is not in acute distress.     Appearance: She is well-developed. She is not ill-appearing or diaphoretic.   HENT:      Head: Normocephalic and atraumatic.      Right Ear: Hearing, tympanic membrane, ear canal and external ear normal. No middle ear effusion. Tympanic membrane is not erythematous.      Left Ear: Hearing, ear canal and external ear normal. A middle ear effusion is present. Tympanic membrane is not erythematous.      Nose: Nose normal. No mucosal edema, congestion or rhinorrhea.      Mouth/Throat:      Mouth: Mucous membranes are not pale, not dry and not cyanotic. No oral lesions.      Tongue: No lesions.      Palate: No lesions.      Pharynx: Uvula midline. No pharyngeal swelling, oropharyngeal exudate, posterior oropharyngeal erythema or  uvula swelling.      Tonsils: No tonsillar exudate. 2+ on the right. 2+ on the left.   Eyes:      General: Lids are normal. No scleral icterus.        Right eye: No discharge.         Left eye: No discharge.   Neck:      Thyroid: No thyroid mass or thyromegaly.      Trachea: Trachea normal. No tracheal deviation.   Cardiovascular:      Rate and Rhythm: Normal rate.   Pulmonary:      Effort: Pulmonary effort is normal. No respiratory distress.      Breath sounds: Normal air entry.   Musculoskeletal:         General: Normal range of motion.      Cervical back: Normal range of motion and neck supple.   Lymphadenopathy:      Head:      Right side of head: No submental, submandibular, tonsillar, preauricular or posterior auricular adenopathy.      Left side of head: No submental, submandibular, tonsillar, preauricular or posterior auricular adenopathy.      Cervical: No cervical adenopathy.      Right cervical: No superficial or posterior cervical adenopathy.     Left cervical: No superficial or posterior cervical adenopathy.   Skin:     General: Skin is warm and dry.      Coloration: Skin is not pale.      Findings: No lesion or rash.   Neurological:      Mental Status: She is alert and oriented to person, place, and time.      Motor: Motor function is intact.      Coordination: Coordination is intact.      Gait: Gait normal.   Psychiatric:         Attention and Perception: Attention normal.         Mood and Affect: Mood normal.         Speech: Speech normal.         Behavior: Behavior normal. Behavior is cooperative.       SEPARATE PROCEDURE IN OFFICE:   Procedure: Removal of impacted cerumen, bilateral   Pre Procedure Diagnosis: Cerumen Impaction   Post Procedure Diagnosis: Cerumen Impaction   Verbal informed consent in regards to risk of trauma to ear canal, ear drum or hearing, discomfort during procedure and/or inability to remove cerumen impaction in one session or unforeseen events or complications.   No  anesthesia.     Procedure in detail:   Ear canal visualized bilateral with appropriate size ear speculum utilizing Operating Head Binocular Otomicroscope   Utilizing the following:  delicate alligator forceps used. The impacted cerumen of the ear canals was removed atraumatically. The TM and EAC were then inspected and found to be clear of wax. See description of TMs/EACs in PE above.   Complications: No   Condition: Improved/Good    Assessment:       Problem List Items Addressed This Visit    None  Visit Diagnoses       Non-recurrent acute serous otitis media of left ear    -  Primary    Relevant Medications    fluticasone propionate (FLONASE) 50 mcg/actuation nasal spray    azelastine (ASTELIN) 137 mcg (0.1 %) nasal spray    Bilateral impacted cerumen        Dysfunction of left eustachian tube                  Plan:     Flonase & Astelin BID, nasal valsalva several times per day.  Discussed ETD, serous/non-suppurative OME vs suppurative OM at length.   Patient to call back for further antibiotics if throbbing otalgia.   Briefly discussed myringotomy vs tympanostomy tubes.   Return to clinic in 6 weeks if not significantly improved. Will need NP scope of ET orifice.

## 2022-11-10 ENCOUNTER — OFFICE VISIT (OUTPATIENT)
Dept: OBSTETRICS AND GYNECOLOGY | Facility: CLINIC | Age: 57
End: 2022-11-10
Payer: COMMERCIAL

## 2022-11-10 VITALS
SYSTOLIC BLOOD PRESSURE: 108 MMHG | HEIGHT: 59 IN | BODY MASS INDEX: 37.65 KG/M2 | DIASTOLIC BLOOD PRESSURE: 78 MMHG | WEIGHT: 186.75 LBS

## 2022-11-10 DIAGNOSIS — F43.9 STRESS: ICD-10-CM

## 2022-11-10 DIAGNOSIS — E66.9 CLASS 2 OBESITY WITH BODY MASS INDEX (BMI) OF 37.0 TO 37.9 IN ADULT, UNSPECIFIED OBESITY TYPE, UNSPECIFIED WHETHER SERIOUS COMORBIDITY PRESENT: ICD-10-CM

## 2022-11-10 DIAGNOSIS — Z63.0 MARITAL RELATIONSHIP PROBLEM: Primary | ICD-10-CM

## 2022-11-10 DIAGNOSIS — R73.03 PREDIABETES: ICD-10-CM

## 2022-11-10 DIAGNOSIS — F52.0 HYPOACTIVE SEXUAL DESIRE DISORDER: ICD-10-CM

## 2022-11-10 PROCEDURE — 3078F PR MOST RECENT DIASTOLIC BLOOD PRESSURE < 80 MM HG: ICD-10-PCS | Mod: CPTII,S$GLB,, | Performed by: OBSTETRICS & GYNECOLOGY

## 2022-11-10 PROCEDURE — 1159F MED LIST DOCD IN RCRD: CPT | Mod: CPTII,S$GLB,, | Performed by: OBSTETRICS & GYNECOLOGY

## 2022-11-10 PROCEDURE — 3008F PR BODY MASS INDEX (BMI) DOCUMENTED: ICD-10-PCS | Mod: CPTII,S$GLB,, | Performed by: OBSTETRICS & GYNECOLOGY

## 2022-11-10 PROCEDURE — 99203 PR OFFICE/OUTPT VISIT, NEW, LEVL III, 30-44 MIN: ICD-10-PCS | Mod: S$GLB,,, | Performed by: OBSTETRICS & GYNECOLOGY

## 2022-11-10 PROCEDURE — 3074F SYST BP LT 130 MM HG: CPT | Mod: CPTII,S$GLB,, | Performed by: OBSTETRICS & GYNECOLOGY

## 2022-11-10 PROCEDURE — 3078F DIAST BP <80 MM HG: CPT | Mod: CPTII,S$GLB,, | Performed by: OBSTETRICS & GYNECOLOGY

## 2022-11-10 PROCEDURE — 3044F HG A1C LEVEL LT 7.0%: CPT | Mod: CPTII,S$GLB,, | Performed by: OBSTETRICS & GYNECOLOGY

## 2022-11-10 PROCEDURE — 3008F BODY MASS INDEX DOCD: CPT | Mod: CPTII,S$GLB,, | Performed by: OBSTETRICS & GYNECOLOGY

## 2022-11-10 PROCEDURE — 3074F PR MOST RECENT SYSTOLIC BLOOD PRESSURE < 130 MM HG: ICD-10-PCS | Mod: CPTII,S$GLB,, | Performed by: OBSTETRICS & GYNECOLOGY

## 2022-11-10 PROCEDURE — 99203 OFFICE O/P NEW LOW 30 MIN: CPT | Mod: S$GLB,,, | Performed by: OBSTETRICS & GYNECOLOGY

## 2022-11-10 PROCEDURE — 3044F PR MOST RECENT HEMOGLOBIN A1C LEVEL <7.0%: ICD-10-PCS | Mod: CPTII,S$GLB,, | Performed by: OBSTETRICS & GYNECOLOGY

## 2022-11-10 PROCEDURE — 1159F PR MEDICATION LIST DOCUMENTED IN MEDICAL RECORD: ICD-10-PCS | Mod: CPTII,S$GLB,, | Performed by: OBSTETRICS & GYNECOLOGY

## 2022-11-10 PROCEDURE — 99999 PR PBB SHADOW E&M-EST. PATIENT-LVL IV: CPT | Mod: PBBFAC,,, | Performed by: OBSTETRICS & GYNECOLOGY

## 2022-11-10 PROCEDURE — 99999 PR PBB SHADOW E&M-EST. PATIENT-LVL IV: ICD-10-PCS | Mod: PBBFAC,,, | Performed by: OBSTETRICS & GYNECOLOGY

## 2022-11-10 SDOH — SOCIAL DETERMINANTS OF HEALTH (SDOH): PROBLEMS IN RELATIONSHIP WITH SPOUSE OR PARTNER: Z63.0

## 2022-11-10 NOTE — PROGRESS NOTES
Chief Complaint   Patient presents with    Establish Care    concerns about decrease sex drive    concerns about hormones       History of Present Illness: Aidee Larose is a 57 y.o. female that presents today 11/10/2022 for   Chief Complaint   Patient presents with    Establish Care    concerns about decrease sex drive    concerns about hormones     She reports that she just doesn't want to have sex.  She is with her  today that admits he gives her a hard time about this.   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  She reports sex is comfortable and she is able to have an orgasm.  She denies pain or vaginal dryness.  She has ovaries out  almost 10 year ago.  She has uses estrace vaginally but stopped due her history of PE after a foot fracture. They has consider counseling for this and want to try.        Past Medical History:   Diagnosis Date    Hypertension     Hypothyroidism     Left navicular fracture of foot     Miscarriage     x2 first trimester    PE (pulmonary embolism)     after a foot fracture in     PONV (postoperative nausea and vomiting)     Thyroid disease     hypothyroidism       Past Surgical History:   Procedure Laterality Date    BELT ABDOMINOPLASTY  2013    breast reduction      CARPAL TUNNEL RELEASE Bilateral  and     Dr. Funes     SECTION      COLONOSCOPY N/A 2018    Procedure: COLONOSCOPY;  Surgeon: Jace Spangler MD;  Location: [facility];  Service: Endoscopy;  Laterality: N/A;    HYSTERECTOMY  2013    SAMI BSO    LASER ABLATION      OOPHORECTOMY  2013    SAMI BSO    PELVIC LAPAROSCOPY      TOTAL REDUCTION MAMMOPLASTY Bilateral 1992    TUBAL LIGATION         Current Outpatient Medications   Medication Sig Dispense Refill    amLODIPine (NORVASC) 5 MG tablet Take 1 tablet (5 mg total) by mouth once daily. 90 tablet 3    levothyroxine (SYNTHROID) 50  "MCG tablet Take 1 tablet (50 mcg total) by mouth once daily. 90 tablet 3    multivitamin (THERAGRAN) per tablet Take 1 tablet by mouth once daily.      valACYclovir (VALTREX) 1000 MG tablet Take 2 tablets (2,000 mg total) by mouth 2 (two) times daily as needed (fever blister). 4 tablet 11    azelastine (ASTELIN) 137 mcg (0.1 %) nasal spray 1 spray (137 mcg total) by Nasal route 2 (two) times daily. (Patient not taking: Reported on 11/10/2022) 30 mL 12    estradioL (ESTRACE) 0.01 % (0.1 mg/gram) vaginal cream Place 2 g vaginally twice a week. (Patient not taking: Reported on 11/10/2022) 42.5 g 5    fluticasone propionate (FLONASE) 50 mcg/actuation nasal spray 1 spray (50 mcg total) by Each Nostril route 2 (two) times daily. (Patient not taking: Reported on 11/10/2022) 16 g 12     No current facility-administered medications for this visit.       Review of patient's allergies indicates:  No Known Allergies    Family History   Problem Relation Age of Onset    Kidney cancer Mother     Cancer Mother         renal cancer    Heart disease Mother         valvular heart disease (history of maureen fever)    Arthritis Sister         rheumatoid arthritis    Diabetes Brother     Hypertension Brother     Breast cancer Neg Hx     Ovarian cancer Neg Hx        Social History     Tobacco Use    Smoking status: Never    Smokeless tobacco: Never   Substance Use Topics    Alcohol use: No    Drug use: No       OB History    Para Term  AB Living   6 3 3   3 3   SAB IAB Ectopic Multiple Live Births   3       3      # Outcome Date GA Lbr Titus/2nd Weight Sex Delivery Anes PTL Lv   6 Term     M CS-LTranv EPI N HUMBLE   5 SAB            4 SAB            3 Term    3.969 kg (8 lb 12 oz) M CS-LTranv EPI N HUMBLE   2 SAB            1 Term    3.204 kg (7 lb 1 oz) M CS-LTranv EPI N HUMBLE         /78   Ht 4' 11" (1.499 m)   Wt 84.7 kg (186 lb 11.7 oz)   LMP 2013       ASSESSMENT/PLAN:  Marital " relationship problem  -     Ambulatory referral/consult to Psychiatry; Future; Expected date: 11/17/2022    Hypoactive sexual desire disorder  -     Ambulatory referral/consult to Psychiatry; Future; Expected date: 11/17/2022    Stress  -     Ambulatory referral/consult to Psychiatry; Future; Expected date: 11/17/2022    Class 2 obesity with body mass index (BMI) of 37.0 to 37.9 in adult, unspecified obesity type, unspecified whether serious comorbidity present    Prediabetes    We discussed Risks of HRT.  We discussed risks of PE.  We also discussed risks of alternatives like testosterone with her prediabetes.  I would continue estrace but avoid testo.  She is willing to try counseling.  As it seems that marital relationship  is main culprit of her lack of desire.    30 minutes spent today spent preparing reviewing previous external notes, reviewing previous results, performing medical examination, ordering tests or medications, counseling and documenting.

## 2022-11-16 ENCOUNTER — PATIENT MESSAGE (OUTPATIENT)
Dept: PSYCHIATRY | Facility: CLINIC | Age: 57
End: 2022-11-16
Payer: COMMERCIAL

## 2022-12-28 ENCOUNTER — OFFICE VISIT (OUTPATIENT)
Dept: PSYCHIATRY | Facility: CLINIC | Age: 57
End: 2022-12-28
Payer: COMMERCIAL

## 2022-12-28 VITALS — HEIGHT: 59 IN | BODY MASS INDEX: 38.09 KG/M2 | WEIGHT: 188.94 LBS

## 2022-12-28 DIAGNOSIS — F43.9 STRESS: ICD-10-CM

## 2022-12-28 DIAGNOSIS — F52.0 HYPOACTIVE SEXUAL DESIRE DISORDER: ICD-10-CM

## 2022-12-28 DIAGNOSIS — Z63.0 MARITAL RELATIONSHIP PROBLEM: ICD-10-CM

## 2022-12-28 PROCEDURE — 1159F MED LIST DOCD IN RCRD: CPT | Mod: CPTII,S$GLB,, | Performed by: NURSE PRACTITIONER

## 2022-12-28 PROCEDURE — 3044F PR MOST RECENT HEMOGLOBIN A1C LEVEL <7.0%: ICD-10-PCS | Mod: CPTII,S$GLB,, | Performed by: NURSE PRACTITIONER

## 2022-12-28 PROCEDURE — 3008F PR BODY MASS INDEX (BMI) DOCUMENTED: ICD-10-PCS | Mod: CPTII,S$GLB,, | Performed by: NURSE PRACTITIONER

## 2022-12-28 PROCEDURE — 1159F PR MEDICATION LIST DOCUMENTED IN MEDICAL RECORD: ICD-10-PCS | Mod: CPTII,S$GLB,, | Performed by: NURSE PRACTITIONER

## 2022-12-28 PROCEDURE — 90792 PR PSYCHIATRIC DIAGNOSTIC EVALUATION W/MEDICAL SERVICES: ICD-10-PCS | Mod: S$GLB,,, | Performed by: NURSE PRACTITIONER

## 2022-12-28 PROCEDURE — 1160F RVW MEDS BY RX/DR IN RCRD: CPT | Mod: CPTII,S$GLB,, | Performed by: NURSE PRACTITIONER

## 2022-12-28 PROCEDURE — 99999 PR PBB SHADOW E&M-EST. PATIENT-LVL III: ICD-10-PCS | Mod: PBBFAC,,, | Performed by: NURSE PRACTITIONER

## 2022-12-28 PROCEDURE — 90792 PSYCH DIAG EVAL W/MED SRVCS: CPT | Mod: S$GLB,,, | Performed by: NURSE PRACTITIONER

## 2022-12-28 PROCEDURE — 3044F HG A1C LEVEL LT 7.0%: CPT | Mod: CPTII,S$GLB,, | Performed by: NURSE PRACTITIONER

## 2022-12-28 PROCEDURE — 99999 PR PBB SHADOW E&M-EST. PATIENT-LVL III: CPT | Mod: PBBFAC,,, | Performed by: NURSE PRACTITIONER

## 2022-12-28 PROCEDURE — 3008F BODY MASS INDEX DOCD: CPT | Mod: CPTII,S$GLB,, | Performed by: NURSE PRACTITIONER

## 2022-12-28 PROCEDURE — 1160F PR REVIEW ALL MEDS BY PRESCRIBER/CLIN PHARMACIST DOCUMENTED: ICD-10-PCS | Mod: CPTII,S$GLB,, | Performed by: NURSE PRACTITIONER

## 2022-12-28 RX ORDER — BUPROPION HYDROCHLORIDE 150 MG/1
150 TABLET ORAL DAILY
Qty: 30 TABLET | Refills: 2 | Status: SHIPPED | OUTPATIENT
Start: 2022-12-29 | End: 2023-03-27

## 2022-12-28 SDOH — SOCIAL DETERMINANTS OF HEALTH (SDOH): PROBLEMS IN RELATIONSHIP WITH SPOUSE OR PARTNER: Z63.0

## 2022-12-28 NOTE — PROGRESS NOTES
"Outpatient Psychiatry Initial Visit  12/28/2022    ID:   56 yo F presenting for an initial evaluation. Met with patient.    Reason for encounter: Referral from PCP. Patient complains of    History of Present Illness: Pt. is a 56 yo F without a prior psych hx  presenting to the clinic for an initial evaluation and treatment. PMHx outlined below. Pt presented to me not taking any psychiatric and denies any past medication trials.    Pt reports that over the past year, her sex drive has significantly decreased and this has begun to cause relationship issues. She did have a hysterectomy in 2013 and reports some hormonal changes since this. Current stressors include her 19 year son's continued health issues (chronic pancreatitis resultant in removal of his pancreas). He is now a diabetic, and did recently move to college and this has been taking a significant toll on her mental health. "I just don't have control of him anymore. I always worry about if he's taking his medication on time. Just stuff as a mother."    She denies any major struggles with true depression or clinical anxiety historically. Feels her primary issue is that her stress levels are negatively impacting her quality of life, sex drive, and energy levels. "I love my  and don't want our relationship."     Pt currently endorses or denies the following symptoms:  Psych ROS:  Depression: no hx of true of depressive episodes  Anxiety: no panic attacks, no agoraphobia, no social anxiety  PTSD: no flashbacks, nightmares, or avoidance of stimuli  Melia/Psychosis: No manic episodes, no A/V hallucinations  SI - No SI - access to guns? denies    Past Psychiatric History:  Past Psych Hx: First psych contact: denies  Prior hospitalizations: denies  Prior suicide attempts or self harm: denies  Prior diagnosis: denies  Prior meds: denies  Current meds: denies  Prior psychotherapy: denies      Past Medical Hx: Hx of TBI?   denies   Hx of seizures? denies  Past " Medical History:   Diagnosis Date    Hypertension     Hypothyroidism     Left navicular fracture of foot     Miscarriage     x2 first trimester    PE (pulmonary embolism)     after a foot fracture in     PONV (postoperative nausea and vomiting)     Thyroid disease     hypothyroidism         Past Surgical Hx:  Past Surgical History:   Procedure Laterality Date    BELT ABDOMINOPLASTY  2013    breast reduction      CARPAL TUNNEL RELEASE Bilateral  and     Dr. Funes     SECTION      COLONOSCOPY N/A 2018    Procedure: COLONOSCOPY;  Surgeon: Jace Spangler MD;  Location: [facility];  Service: Endoscopy;  Laterality: N/A;    HYSTERECTOMY  2013    [facility]    LASER ABLATION      OOPHORECTOMY  2013    [facility]    PELVIC LAPAROSCOPY      TOTAL REDUCTION MAMMOPLASTY Bilateral     TUBAL LIGATION             Family Hx:   Paternal: unaware of any mental illness  Maternal: unaware of any mental illness  Sister anxiety      Social Hx: unable to obtain  Childhood:   Marital Status:  Children:  Resides:  Occupation:  Hobbies:  Jainism:  Education level:  :   Legal:     Substance Hx:   Tobacco: denies  Alcohol: denies  Drug use: denies  Caffeine: denies  Rehab: denies  Prior/current AA? dnies    Review of Symptoms  GENERAL: no weight gain/loss  SKIN: no rashes or lacerations  HEAD: no headahces  EYES: no jaundice, blindness. No exophthalmos  EARS: no dizziness, tinnitus, or hearing loss  NOSE: no changes in smell  Mouth/throat: no dyskinetic movements or obvious goiter  CHEST: no SOB, hyperventilation or cough  CARDIO: no tachycardia, bradycardia, or chest pain  ABDOMEN: no nausea, vomiting, pain, constipation, or diarrhea  URINARY:  no frequency, dysuria, or sexual dysfunction  ENDOCRINE: No polydipsia, polyuria, no cold/hot intolerance  MUSCULOSKELETAL: no joint pain/stiffness  NEUROLOGIC: no weakness or sensory changes, no seizures, no confusion, memory loss, or  "forgetfulness, no tremor or abnormal movements    Current Evaluation:  Nutritional Screening:  Considering the patient's height and weight, medications, medical history and preferences, should a referral be made to the dietitian? No  Vitals: most recent vitals signs, dated greater than 90 days prior to this appointment, were reviewed  General: age appropriate, well nourished, casually dressed, neatly groomed  MSK: muscle strength/tone : no tremor or abnormal movements. Gait/Station: no ataxic, steady    Suicide Risk Assessment:  Protective factors: age, gender, no prior attempts, no prior hospitalizations, no ongoing substance abuse, no psychosis, , has children, denies SI/intent/plan, seeking treatment, access to treatment, future oriented, good primary support, no access to firearms    Risks: low    Patient is a low immediate and long-term risk considering risk factors    Psychiatric:  Speech: Normal rate, rhythm, volume. No latency, no pressured speech  Mood/Affect: euthymic, congruent and appropriate   Though Process: organized, logical, linear  Thought Content: no suicidal or homicidal ideation, no A/V hallucinations, delusions or paranoia  Insight: Intact; aware of illness  Judgement: behavior is adequate to circumstances  Orientation: A&O x 4,  Memory: Intact for content of interview, 3/3 immediate, 3/3 after 3 mins. Able to recall recent and remote events.  Language: Grossly intact, no aphasias   Concentration: Spells "world" correctly forward & backwards  Knowledge/Intelligence: appropriate to age and level of education.   Spouse/Partner: Supportive    ASSESSMENT - DIAGNOSIS - GOALS:  Impression:                            Safe for outpatient tx and no acute safety concerns.  Diagnosis/Diagnoses: adjustment disorder    Strengths/Liabilities: Patient accepts feedback & guidance. Patient is motivated for change.     Treatment Goals: Specify outcomes written in observable, behavioral terms  Anxiety: " acquire relapse prevention skills, reduce physical symptoms of anxiety, reduce time spent worrying (>30 minutes/day)  Depression: Acquire relapse prevention skills, increasing energy, increasing interest in usual activities, increasing motivation, reducing excessive guilt and reducing fatigue.    Treatment Plan/Recommendations:   Medication Management: The risks and benefits of medication were discussed with the patient.   Meds:    1) Start Wellbutrin-XL 150mg QD for dec'd libido        Labs: none  Return to Clinic: 8   Counseling time: 35 mins  Total time: 60 mins    -  Patient given contact # for psychotherapists at [facility] [place] and also instructed they may check with insurance for a list of providers.   -Call to report any worsening of symptoms or problems associated with medication  - Pt instructed to go to ER if thoughts of harming self or others arise     -Spent 60min face to face with the pt; >50% time spent in counseling   -Supportive therapy and psychoeducation provided  -R/B/SE's of medications discussed with the pt who expresses understanding and chooses to take medications as prescribed.   -Pt instructed to call clinic, 911 or go to nearest emergency room if sxs worsen or pt is in   crisis. The pt expresses understanding.    Chuy Duarte, NP

## 2023-01-04 ENCOUNTER — PATIENT MESSAGE (OUTPATIENT)
Dept: PSYCHIATRY | Facility: CLINIC | Age: 58
End: 2023-01-04
Payer: COMMERCIAL

## 2023-01-23 ENCOUNTER — PATIENT MESSAGE (OUTPATIENT)
Dept: PSYCHIATRY | Facility: CLINIC | Age: 58
End: 2023-01-23
Payer: COMMERCIAL

## 2023-02-22 ENCOUNTER — PATIENT MESSAGE (OUTPATIENT)
Dept: PSYCHIATRY | Facility: CLINIC | Age: 58
End: 2023-02-22
Payer: COMMERCIAL

## 2023-02-22 ENCOUNTER — OFFICE VISIT (OUTPATIENT)
Dept: PSYCHIATRY | Facility: CLINIC | Age: 58
End: 2023-02-22
Payer: COMMERCIAL

## 2023-02-22 DIAGNOSIS — F43.20 ADJUSTMENT DISORDER, UNSPECIFIED TYPE: ICD-10-CM

## 2023-02-22 PROCEDURE — 99214 OFFICE O/P EST MOD 30 MIN: CPT | Mod: 95,,, | Performed by: NURSE PRACTITIONER

## 2023-02-22 PROCEDURE — 1159F MED LIST DOCD IN RCRD: CPT | Mod: CPTII,95,, | Performed by: NURSE PRACTITIONER

## 2023-02-22 PROCEDURE — 1160F PR REVIEW ALL MEDS BY PRESCRIBER/CLIN PHARMACIST DOCUMENTED: ICD-10-PCS | Mod: CPTII,95,, | Performed by: NURSE PRACTITIONER

## 2023-02-22 PROCEDURE — 1160F RVW MEDS BY RX/DR IN RCRD: CPT | Mod: CPTII,95,, | Performed by: NURSE PRACTITIONER

## 2023-02-22 PROCEDURE — 1159F PR MEDICATION LIST DOCUMENTED IN MEDICAL RECORD: ICD-10-PCS | Mod: CPTII,95,, | Performed by: NURSE PRACTITIONER

## 2023-02-22 PROCEDURE — 99214 PR OFFICE/OUTPT VISIT, EST, LEVL IV, 30-39 MIN: ICD-10-PCS | Mod: 95,,, | Performed by: NURSE PRACTITIONER

## 2023-02-22 NOTE — PROGRESS NOTES
"  Outpatient Psychiatry Follow-Up Visit    Clinical Status of Patient: Outpatient (Ambulatory)  02/22/2023     Chief Complaint: Pt is a 56 yo F who presents today for a follow-up. Met with patient.       Interval History and Content of Current Session:  Interim Events/Subjective Report/Content of Current Session:  follow up appointment.    Pt is a 56 yo F w/ pas psych hx of adjustment disroder  who I am treating for dec'd libido with Wellbutrin-XL 150mg QD. Since starting around 2 months ago, pt reports improved mood and stress toelrance. She notes a heightened ability to focus which has made functioning easier both at home and at work. She has noticed midly improved libido. Has tolerated med well. Pts table and high functioning. Pt educated on various lifestyle changes conducive to improving physical/mental/sexual health.         Past Psychiatric hx: History of Present Illness: Pt. is a 56 yo F without a prior psych hx  presenting to the clinic for an initial evaluation and treatment. PMHx outlined below. Pt presented to me not taking any psychiatric and denies any past medication trials.     Pt reports that over the past year, her sex drive has significantly decreased and this has begun to cause relationship issues. She did have a hysterectomy in 2013 and reports some hormonal changes since this. Current stressors include her 19 year son's continued health issues (chronic pancreatitis resultant in removal of his pancreas). He is now a diabetic, and did recently move to college and this has been taking a significant toll on her mental health. "I just don't have control of him anymore. I always worry about if he's taking his medication on time. Just stuff as a mother."     She denies any major struggles with true depression or clinical anxiety historically. Feels her primary issue is that her stress levels are negatively impacting her quality of life, sex drive, and energy levels. "I love my  and don't want " "our relationship."          Past Medical hx:   Past Medical History:   Diagnosis Date    Hypertension     Hypothyroidism     Left navicular fracture of foot 2005    Miscarriage     x2 first trimester    PE (pulmonary embolism)     after a foot fracture in 2005    PONV (postoperative nausea and vomiting)     Thyroid disease     hypothyroidism        Interim hx:  Medication changes last visit:start wellbutrin-xl  Anxiety: denies  Depression: denies     Denies suicidal/homicidal ideations.  Denies hopelessness/worthlessness.    Denies auditory/visual hallucinations     Substance abuse: denies      Review of Systems   PSYCHIATRIC: Pertinent items are noted in the narrative.        CONSTITUTIONAL: weight stable        M/S: no pain today         ENT: no allergies noted today        ABD: no n/v/d     Past Medical, Family and Social History: The patient's past medical, family and social history have been reviewed and updated as appropriate within the electronic medical record. See encounter notes.     Medication:     Compliance: yes      Side effects: tolerates     Risk Parameters:  Patient reports no suicidal ideation  Patient reports no homicidal ideation  Patient reports no self-injurious behavior  Patient reports no violent behavior     Exam (detailed: at least 9 elements; comprehensive: all 15 elements)   Constitutional  Vitals:  Most recent vital signs, dated less than 90 days prior to this appointment, were reviewed.    General:  unremarkable, age appropriate, casual attire, good eye contact, good rapport       Musculoskeletal  Muscle Strength/Tone:  no flaccidity, no tremor    Gait & Station:  normal      Psychiatric                       Speech:  normal tone, normal rate, rhythm, and volume   Mood & Affect:   Euthymic, congruent, appropriate         Thought Process:   Goal directed; Linear    Associations:   intact   Thought Content:   No SI/HI, delusions, or paranoia, no AV/VH   Insight & Judgement:   Good, " adequate to circumstances   Orientation:   grossly intact; alert and oriented x 4    Memory:  intact for content of interview    Language:  grossly intact, can repeat    Attention Span  : Grossly intact for content of interview   Fund of Knowledge:   intact and appropriate to age and level of education        Assessment and Diagnosis   Status/Progress: Based on the examination today, the patient's problem(s) is/are under fair control.  New problems have not been presented today. Comorbidities are not currently complicating management of the primary condition.      Impression:         Pt is a 58 yo F who I am treating for dec'd libido with Wellbutrin-XL 150mg QD. Since starting around 2 months ago, pt reports improved mood and stress toelrance. She notes a heightened ability to focus which has made functioning easier both at home and at work. She has noticed midly improved libido. Has tolerated med well. Pts table and high functioning. Pt educated on various lifestyle changes conducive to improving physical/mental/sexual health.                   Diagnosis: adjustment disorder    Intervention/Counseling/Treatment Plan   Medication Management:      1. Cont Wellbutrin-XL 150mg qd       4. Call to report any worsening of symptoms or problems with the medication. Pt instructed to go to ER with thoughts of harming self, others     5. Patient given contact # for psychotherapists at [facility] and also instructed she may check with insurance for list of providers.      6. Labs:     Psychotherapy:   Target symptoms: decd libido,   Why chosen therapy is appropriate versus another modality: relevant to diagnosis, patient responds to this modality  Outcome monitoring methods: self-report, observation, feedback from family   Therapeutic intervention type: supportive psychotherapy  Topics discussed/themes: building skills sets for symptom management, symptom recognition, nutrition, exercise  The patient's response to the  intervention is accepting. The patient's progress toward treatment goals is positive progress.  Duration of intervention: 20 minutes     Return to clinic:    -Spent 30min face to face with the pt; >50% time spent in counseling   -Supportive therapy and psychoeducation provided  -R/B/SE's of medications discussed with the pt who expresses understanding and chooses to take medications as prescribed.   -Pt instructed to call clinic, 911 or go to nearest emergency room if sxs worsen or pt is in   crisis. The pt expresses understanding.    Chuy Duarte, NP

## 2023-05-05 DIAGNOSIS — B00.1 FEVER BLISTER: ICD-10-CM

## 2023-05-05 RX ORDER — VALACYCLOVIR HYDROCHLORIDE 1 G/1
2000 TABLET, FILM COATED ORAL 2 TIMES DAILY PRN
Qty: 4 TABLET | Refills: 11 | Status: SHIPPED | OUTPATIENT
Start: 2023-05-05 | End: 2023-05-06

## 2023-05-05 NOTE — TELEPHONE ENCOUNTER
Care Due:                  Date            Visit Type   Department     Provider  --------------------------------------------------------------------------------                                MYCHART                              ANNUAL                              CHECKUP/PHY  [facility]  Last Visit: 06-      CASH BERGERON  Next Visit: None Scheduled  None         None Found                                                            Last  Test          Frequency    Reason                     Performed    Due Date  --------------------------------------------------------------------------------    Office Visit  12 months..  amLODIPine,                06-   06-                             levothyroxine,                             valACYclovir.............    CBC.........  12 months..  valACYclovir.............  06- 06-    Cr..........  12 months..  valACYclovir.............  06- 06-    TSH.........  12 months..  levothyroxine............  06- 06-    [facility] Embedded Care Due Messages. Reference number: [id number].   5/05/2023 11:02:27 AM CDT

## 2023-06-21 DIAGNOSIS — I10 ESSENTIAL HYPERTENSION: ICD-10-CM

## 2023-06-29 RX ORDER — BUPROPION HYDROCHLORIDE 150 MG/1
TABLET ORAL
Qty: 90 TABLET | Refills: 0 | Status: SHIPPED | OUTPATIENT
Start: 2023-06-29 | End: 2023-08-30

## 2023-07-10 ENCOUNTER — TELEPHONE (OUTPATIENT)
Dept: FAMILY MEDICINE | Facility: CLINIC | Age: 58
End: 2023-07-10
Payer: COMMERCIAL

## 2023-07-10 NOTE — TELEPHONE ENCOUNTER
Attempted to call pt to reschedule 7/12/23 appt with Dr. Evans.  No answer, left voicemail with call back number

## 2023-07-11 ENCOUNTER — PATIENT MESSAGE (OUTPATIENT)
Dept: FAMILY MEDICINE | Facility: CLINIC | Age: 58
End: 2023-07-11
Payer: COMMERCIAL

## 2023-07-21 DIAGNOSIS — M25.571 RIGHT ANKLE PAIN, UNSPECIFIED CHRONICITY: Primary | ICD-10-CM

## 2023-07-27 ENCOUNTER — HOSPITAL ENCOUNTER (OUTPATIENT)
Dept: RADIOLOGY | Facility: HOSPITAL | Age: 58
Discharge: HOME | End: 2023-07-27
Attending: ORTHOPAEDIC SURGERY
Payer: COMMERCIAL

## 2023-07-27 ENCOUNTER — OFFICE VISIT (OUTPATIENT)
Dept: ORTHOPEDICS | Facility: CLINIC | Age: 58
End: 2023-07-27
Payer: COMMERCIAL

## 2023-07-27 ENCOUNTER — PATIENT MESSAGE (OUTPATIENT)
Dept: PSYCHIATRY | Facility: CLINIC | Age: 58
End: 2023-07-27
Payer: COMMERCIAL

## 2023-07-27 DIAGNOSIS — M70.62 GREATER TROCHANTERIC BURSITIS OF LEFT HIP: ICD-10-CM

## 2023-07-27 DIAGNOSIS — M21.6X1 ACQUIRED CAVOVARUS FOOT DEFORMITY, RIGHT: ICD-10-CM

## 2023-07-27 DIAGNOSIS — S93.491A SPRAIN OF ANTERIOR TALOFIBULAR LIGAMENT OF RIGHT ANKLE, INITIAL ENCOUNTER: Primary | ICD-10-CM

## 2023-07-27 DIAGNOSIS — M25.571 RIGHT ANKLE PAIN, UNSPECIFIED CHRONICITY: ICD-10-CM

## 2023-07-27 PROCEDURE — 1159F PR MEDICATION LIST DOCUMENTED IN MEDICAL RECORD: ICD-10-PCS | Mod: CPTII,S$GLB,, | Performed by: ORTHOPAEDIC SURGERY

## 2023-07-27 PROCEDURE — 99999 PR PBB SHADOW E&M-EST. PATIENT-LVL III: ICD-10-PCS | Mod: PBBFAC,,, | Performed by: ORTHOPAEDIC SURGERY

## 2023-07-27 PROCEDURE — 73610 X-RAY EXAM OF ANKLE: CPT | Mod: TC,PO,RT

## 2023-07-27 PROCEDURE — 73610 XR ANKLE COMPLETE 3 VIEW RIGHT: ICD-10-PCS | Mod: 26,RT,, | Performed by: RADIOLOGY

## 2023-07-27 PROCEDURE — 73610 X-RAY EXAM OF ANKLE: CPT | Mod: 26,RT,, | Performed by: RADIOLOGY

## 2023-07-27 PROCEDURE — 99204 PR OFFICE/OUTPT VISIT, NEW, LEVL IV, 45-59 MIN: ICD-10-PCS | Mod: S$GLB,,, | Performed by: ORTHOPAEDIC SURGERY

## 2023-07-27 PROCEDURE — 73630 XR FOOT COMPLETE 3 VIEW RIGHT: ICD-10-PCS | Mod: 26,RT,, | Performed by: RADIOLOGY

## 2023-07-27 PROCEDURE — 99999 PR PBB SHADOW E&M-EST. PATIENT-LVL III: CPT | Mod: PBBFAC,,, | Performed by: ORTHOPAEDIC SURGERY

## 2023-07-27 PROCEDURE — 73630 X-RAY EXAM OF FOOT: CPT | Mod: TC,PO,RT

## 2023-07-27 PROCEDURE — 1159F MED LIST DOCD IN RCRD: CPT | Mod: CPTII,S$GLB,, | Performed by: ORTHOPAEDIC SURGERY

## 2023-07-27 PROCEDURE — 99204 OFFICE O/P NEW MOD 45 MIN: CPT | Mod: S$GLB,,, | Performed by: ORTHOPAEDIC SURGERY

## 2023-07-27 PROCEDURE — 1160F PR REVIEW ALL MEDS BY PRESCRIBER/CLIN PHARMACIST DOCUMENTED: ICD-10-PCS | Mod: CPTII,S$GLB,, | Performed by: ORTHOPAEDIC SURGERY

## 2023-07-27 PROCEDURE — 73630 X-RAY EXAM OF FOOT: CPT | Mod: 26,RT,, | Performed by: RADIOLOGY

## 2023-07-27 PROCEDURE — 1160F RVW MEDS BY RX/DR IN RCRD: CPT | Mod: CPTII,S$GLB,, | Performed by: ORTHOPAEDIC SURGERY

## 2023-07-27 RX ORDER — MELOXICAM 15 MG/1
15 TABLET ORAL DAILY
Qty: 30 TABLET | Refills: 2 | Status: SHIPPED | OUTPATIENT
Start: 2023-07-27 | End: 2023-10-25

## 2023-07-27 NOTE — PROGRESS NOTES
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  Date of Surgery: none  Date of Injury: 2023  Return visit: PRN  X-rays on Return: pending patient complaint    Chief Complaint:   Chief Complaint   Patient presents with    Right Ankle - Pain     Present History:  Aidee Larose is a 58 y.o. female who presents today via referral from Dr. STACIE Evans.  She presents today with a several week history of persistent right lateral ankle pain.  Denies any specific history of injury however she was moving furniture with her son when she 1st noticed it.  Questionable inversion-type right ankle injury.  Denies any pain prior to injury.  No subjective instability.  No previous evaluation or treatment.  Describes the pain as dull and achy.  Not taking any oral NSAIDs.  Intermittent over-the-counter oral Tylenol as needed for pain.  Patient does have documented hypertension this is why she is been refraining.  Denies any numbness or tingling.  No history of boot or brace immobilization, physical therapy, shoe wear or activity modification.  Past medical history significant for hypertension and distant history of PE.    Denies tobacco use.  Works a desk job.      Past Medical History:   Diagnosis Date    Hypertension     Hypothyroidism     Left navicular fracture of foot     Miscarriage     x2 first trimester    PE (pulmonary embolism)     after a foot fracture in     PONV (postoperative nausea and vomiting)     Thyroid disease     hypothyroidism       Past Surgical History:   Procedure Laterality Date    BELT ABDOMINOPLASTY  2013    breast reduction      CARPAL TUNNEL RELEASE Bilateral  and     Dr. Funes     SECTION      COLONOSCOPY N/A 2018    Procedure: COLONOSCOPY;  Surgeon: Jace Spangler MD;  Location: [facility];  Service: Endoscopy;  Laterality: N/A;    HYSTERECTOMY  2013    [facility]    LASER ABLATION       OOPHORECTOMY  02/22/2013    [facility]    PELVIC LAPAROSCOPY      TOTAL REDUCTION MAMMOPLASTY Bilateral 1992    TUBAL LIGATION         Current Outpatient Medications   Medication Sig    amLODIPine (NORVASC) 5 MG tablet Take 1 tablet (5 mg total) by mouth once daily.    azelastine (ASTELIN) 137 mcg (0.1 %) nasal spray 1 spray (137 mcg total) by Nasal route 2 (two) times daily. (Patient not taking: Reported on 11/10/2022)    buPROPion (WELLBUTRIN XL) 150 MG TB24 tablet TAKE 1 TABLET BY MOUTH EVERY DAY    estradioL (ESTRACE) 0.01 % (0.1 mg/gram) vaginal cream Place 2 g vaginally twice a week.    fluticasone propionate (FLONASE) 50 mcg/actuation nasal spray 1 spray (50 mcg total) by Each Nostril route 2 (two) times daily. (Patient not taking: Reported on 11/10/2022)    levothyroxine (SYNTHROID) 50 MCG tablet Take 1 tablet (50 mcg total) by mouth once daily.    multivitamin (THERAGRAN) per tablet Take 1 tablet by mouth once daily.    valACYclovir (VALTREX) 1000 MG tablet Take 2 tablets (2,000 mg total) by mouth 2 (two) times daily as needed (fever blister).     No current facility-administered medications for this visit.       Review of patient's allergies indicates:  No Known Allergies    Family History   Problem Relation Age of Onset    Kidney cancer Mother     Cancer Mother         renal cancer    Heart disease Mother         valvular heart disease (history of maureen fever)    Arthritis Sister         rheumatoid arthritis    Diabetes Brother     Hypertension Brother     Breast cancer Neg Hx     Ovarian cancer Neg Hx        Social History     Socioeconomic History    Marital status:     Number of children: 3   Occupational History     Employer: [employer]   Tobacco Use    Smoking status: Never    Smokeless tobacco: Never   Substance and Sexual Activity    Alcohol use: No    Drug use: No    Sexual activity: Yes     Partners: Male     Birth control/protection: See Surgical Hx       Physical  exam:  There were no vitals filed for this visit.  There is no height or weight on file to calculate BMI.  General: In no apparent distress; well developed and well nourished.  HEENT: normocephalic; atraumatic.  Cardiovascular: regular rate.  Respiratory: no increased work of breathing.  Musculoskeletal:   Gait:  Mild antalgic  Inspection:   Moderate to severe, bilateral, right > > left, cavovarus foot deformity.  Partial correction passively on the right.  With Villa block testing it does correct just short of neutral.  Some degree of labral or overload on the right on gait but asymptomatic.  Patient localizes all pain to the ATFL and CFL.  No tenderness along the course of the peroneal tendons.  No laxity with anterior drawer or varus talar tilt testing.  Good strength resisted foot eversion.  No pain referable to the foot.  No laxity or pain with Lisfranc stress.  Silfverskiold:  Positive  Alignment:  Knee: neutral               Ankle: neutral              Hindfoot:  Varus              Forefoot: neutral   Strength:              Dorsiflexion 5/5  Plantar flexion 5/5  Inversion 5/5   Eversion 5/5   Sensation:              SILT distally  ROM:              Ankle and subtalar: Near full with pain at extremes.  Pulses: 2+ DP/PT pulses.    On the left, patient with point tenderness over the lateral aspect of the proximal femur.  Findings consistent with trochanteric bursitis.  Full painless hip range of motion internal/external rotation.  No pain localized to the groin.  No radiating symptoms.  Negative straight leg raise testing.                   Imaging Studies/Outside documentation:  I have ordered/reviewed/interpreted the following images/outside documentation:  1. Weight bearing 3-views of Right foot and ankle:   On my independent review, no acute bony abnormality noted.  Patient with significant increased calcaneal pitch a plantar flexed 1st ray consistent with humpback deformity.  Talonavicular over coverage  and metatarsal stacking are present.  Ankle mortise remains congruent.  No significant degenerative joint changes.        Assessment:  Aidee Larose is a 58 y.o. 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     Plan:   Clinical and radiographic findings were discussed.  Recommend conservative management to include patient be fit for a lace-up ankle brace today for increased support.  Initiate physical therapy per low ankle sprain protocol.    Short course of oral NSAIDs provided, Mobic prescription given.  Patient to take with food.  Only to take over the next 1-2 weeks given history of hypertension.  I did discuss the natural history of cavovarus foot deformity is more prominent on the right than the left the predisposes her to recurrent ankle sprains.  We will provide a handout to accommodate for this.  Rene Pizarro insert handout provided.  Regarding her left hip trochanteric bursitis.  Discussed diagnostic/therapeutic corticosteroid injection however patient deferred.  Should symptoms persist, may return for injection.  Patient voiced understanding.  All questions were answered.      We performed a custom orthotic/brace fitting, adjusting and training with the patient. The patient demonstrated understanding and proper care. This was performed for 15 minutes.      This note was created using voice recognition software and may contain grammatical errors.

## 2023-07-28 ENCOUNTER — TELEPHONE (OUTPATIENT)
Dept: PSYCHIATRY | Facility: CLINIC | Age: 58
End: 2023-07-28
Payer: COMMERCIAL

## 2023-07-28 ENCOUNTER — OFFICE VISIT (OUTPATIENT)
Dept: PSYCHIATRY | Facility: CLINIC | Age: 58
End: 2023-07-28
Payer: COMMERCIAL

## 2023-07-28 DIAGNOSIS — F43.20 ADJUSTMENT DISORDER, UNSPECIFIED TYPE: Primary | ICD-10-CM

## 2023-07-28 PROCEDURE — 1160F PR REVIEW ALL MEDS BY PRESCRIBER/CLIN PHARMACIST DOCUMENTED: ICD-10-PCS | Mod: CPTII,95,, | Performed by: NURSE PRACTITIONER

## 2023-07-28 PROCEDURE — 99214 PR OFFICE/OUTPT VISIT, EST, LEVL IV, 30-39 MIN: ICD-10-PCS | Mod: 95,,, | Performed by: NURSE PRACTITIONER

## 2023-07-28 PROCEDURE — 1159F MED LIST DOCD IN RCRD: CPT | Mod: CPTII,95,, | Performed by: NURSE PRACTITIONER

## 2023-07-28 PROCEDURE — 1159F PR MEDICATION LIST DOCUMENTED IN MEDICAL RECORD: ICD-10-PCS | Mod: CPTII,95,, | Performed by: NURSE PRACTITIONER

## 2023-07-28 PROCEDURE — 1160F RVW MEDS BY RX/DR IN RCRD: CPT | Mod: CPTII,95,, | Performed by: NURSE PRACTITIONER

## 2023-07-28 PROCEDURE — 99214 OFFICE O/P EST MOD 30 MIN: CPT | Mod: 95,,, | Performed by: NURSE PRACTITIONER

## 2023-07-28 NOTE — PROGRESS NOTES
"  Outpatient Psychiatry Follow-Up Visit    Clinical Status of Patient: Outpatient (Virtual)  07/28/2023     Chief Complaint: Pt is a 56 yo F who presents today for a follow-up. Met with patient.       Interval History and Content of Current Session:  Interim Events/Subjective Report/Content of Current Session:  follow up appointment.    Pt is a 57 yo F w/ pas psych hx of adjustment disroder  who I am treating for dec'd libido with Wellbutrin-XL 150mg QD. Pt has reported good efficacy and tolerability during treatment so far. I saw this patient around 5 months ago. Today, pt reports that she would like to ween off of her Wellbutrin as "my  is not comfortable with me taking it." She has no psychiatric complaints at this time and I see no issue with d/c. Pt stable.         Past Psychiatric hx: History of Present Illness: Pt. is a 56 yo F without a prior psych hx  presenting to the clinic for an initial evaluation and treatment. PMHx outlined below. Pt presented to me not taking any psychiatric and denies any past medication trials.     Pt reports that over the past year, her sex drive has significantly decreased and this has begun to cause relationship issues. She did have a hysterectomy in 2013 and reports some hormonal changes since this. Current stressors include her 19 year son's continued health issues (chronic pancreatitis resultant in removal of his pancreas). He is now a diabetic, and did recently move to college and this has been taking a significant toll on her mental health. "I just don't have control of him anymore. I always worry about if he's taking his medication on time. Just stuff as a mother."     She denies any major struggles with true depression or clinical anxiety historically. Feels her primary issue is that her stress levels are negatively impacting her quality of life, sex drive, and energy levels. "I love my  and don't want our relationship."          Past Medical hx:   Past " Medical History:   Diagnosis Date    Hypertension     Hypothyroidism     Left navicular fracture of foot 2005    Miscarriage     x2 first trimester    PE (pulmonary embolism)     after a foot fracture in 2005    PONV (postoperative nausea and vomiting)     Thyroid disease     hypothyroidism        Interim hx:  Medication changes last visit:start wellbutrin-xl  Anxiety: denies  Depression: denies     Denies suicidal/homicidal ideations.  Denies hopelessness/worthlessness.    Denies auditory/visual hallucinations     Substance abuse: denies      Review of Systems   PSYCHIATRIC: Pertinent items are noted in the narrative.        CONSTITUTIONAL: weight stable        M/S: no pain today         ENT: no allergies noted today        ABD: no n/v/d     Past Medical, Family and Social History: The patient's past medical, family and social history have been reviewed and updated as appropriate within the electronic medical record. See encounter notes.        Compliance: yes      Side effects: tolerates     Risk Parameters:  Patient reports no suicidal ideation  Patient reports no homicidal ideation  Patient reports no self-injurious behavior  Patient reports no violent behavior     Exam (detailed: at least 9 elements; comprehensive: all 15 elements)   Constitutional  Vitals:  Most recent vital signs, dated less than 90 days prior to this appointment, were reviewed.    General:  unremarkable, age appropriate, casual attire, good eye contact, good rapport       Musculoskeletal  Muscle Strength/Tone:  no flaccidity, no tremor    Gait & Station:  normal      Psychiatric                       Speech:  normal tone, normal rate, rhythm, and volume   Mood & Affect:   Euthymic, congruent, appropriate         Thought Process:   Goal directed; Linear    Associations:   intact   Thought Content:   No SI/HI, delusions, or paranoia, no AV/VH   Insight & Judgement:   Good, adequate to circumstances   Orientation:   grossly intact; alert and  oriented x 4    Memory:  intact for content of interview    Language:  grossly intact, can repeat    Attention Span  : Grossly intact for content of interview   Fund of Knowledge:   intact and appropriate to age and level of education        Assessment and Diagnosis   Status/Progress: Based on the examination today, the patient's problem(s) is/are under fair control.  New problems have not been presented today. Comorbidities are not currently complicating management of the primary condition.      Impression:         Pt is a 58 yo F who I am treating for dec'd libido with Wellbutrin-XL 150mg QD. Since starting around 2 months ago, pt reports improved mood and stress toelrance. She notes a heightened ability to focus which has made functioning easier both at home and at work. She has noticed midly improved libido. Has tolerated med well. Pts table and high functioning. Pt educated on various lifestyle changes conducive to improving physical/mental/sexual health.                   Diagnosis: adjustment disorder    Intervention/Counseling/Treatment Plan   Medication Management:      1. Cont Wellbutrin-XL 150mg qd       4. Call to report any worsening of symptoms or problems with the medication. Pt instructed to go to ER with thoughts of harming self, others     5. Patient given contact # for psychotherapists at [facility] and also instructed she may check with insurance for list of providers.      6. Labs: none         Return to clinic: prn    -Spent 30min face to face with the pt; >50% time spent in counseling   -Supportive therapy and psychoeducation provided  -R/B/SE's of medications discussed with the pt who expresses understanding and chooses to take medications as prescribed.   -Pt instructed to call clinic, 911 or go to nearest emergency room if sxs worsen or pt is in   crisis. The pt expresses understanding.    Chuy Duarte, NP

## 2023-07-28 NOTE — TELEPHONE ENCOUNTER
Spoke with pt and scheduled a virtual follow up appointment for today 7/28/23 at 1 pm with Cuauhtemoc Duarte to discuss concerns. Pt verbalized understanding.

## 2023-08-09 DIAGNOSIS — Z12.31 OTHER SCREENING MAMMOGRAM: ICD-10-CM

## 2023-08-21 ENCOUNTER — PATIENT MESSAGE (OUTPATIENT)
Dept: ADMINISTRATIVE | Facility: HOSPITAL | Age: 58
End: 2023-08-21
Payer: COMMERCIAL

## 2023-08-30 ENCOUNTER — OFFICE VISIT (OUTPATIENT)
Dept: FAMILY MEDICINE | Facility: CLINIC | Age: 58
End: 2023-08-30
Payer: COMMERCIAL

## 2023-08-30 ENCOUNTER — LAB VISIT (OUTPATIENT)
Dept: LAB | Facility: HOSPITAL | Age: 58
End: 2023-08-30
Attending: FAMILY MEDICINE
Payer: COMMERCIAL

## 2023-08-30 VITALS
OXYGEN SATURATION: 98 % | SYSTOLIC BLOOD PRESSURE: 110 MMHG | BODY MASS INDEX: 37.51 KG/M2 | DIASTOLIC BLOOD PRESSURE: 78 MMHG | HEIGHT: 59 IN | HEART RATE: 88 BPM | WEIGHT: 186.06 LBS

## 2023-08-30 DIAGNOSIS — I10 ESSENTIAL HYPERTENSION: ICD-10-CM

## 2023-08-30 DIAGNOSIS — Z00.00 WELLNESS EXAMINATION: Primary | ICD-10-CM

## 2023-08-30 DIAGNOSIS — Z00.00 WELLNESS EXAMINATION: ICD-10-CM

## 2023-08-30 LAB
ALBUMIN SERPL BCP-MCNC: 4 G/DL (ref 3.5–5.2)
ALP SERPL-CCNC: 86 U/L (ref 55–135)
ALT SERPL W/O P-5'-P-CCNC: 20 U/L (ref 10–44)
ANION GAP SERPL CALC-SCNC: 12 MMOL/L (ref 8–16)
AST SERPL-CCNC: 21 U/L (ref 10–40)
BASOPHILS # BLD AUTO: 0.03 K/UL (ref 0–0.2)
BASOPHILS NFR BLD: 0.4 % (ref 0–1.9)
BILIRUB SERPL-MCNC: 0.4 MG/DL (ref 0.1–1)
BUN SERPL-MCNC: 11 MG/DL (ref 6–20)
CALCIUM SERPL-MCNC: 9.4 MG/DL (ref 8.7–10.5)
CHLORIDE SERPL-SCNC: 106 MMOL/L (ref 95–110)
CHOLEST SERPL-MCNC: 216 MG/DL (ref 120–199)
CHOLEST/HDLC SERPL: 4.2 {RATIO} (ref 2–5)
CO2 SERPL-SCNC: 25 MMOL/L (ref 23–29)
CREAT SERPL-MCNC: 0.7 MG/DL (ref 0.5–1.4)
DIFFERENTIAL METHOD: ABNORMAL
EOSINOPHIL # BLD AUTO: 0.1 K/UL (ref 0–0.5)
EOSINOPHIL NFR BLD: 1.6 % (ref 0–8)
ERYTHROCYTE [DISTWIDTH] IN BLOOD BY AUTOMATED COUNT: 14 % (ref 11.5–14.5)
EST. GFR  (NO RACE VARIABLE): >60 ML/MIN/1.73 M^2
ESTIMATED AVG GLUCOSE: 120 MG/DL (ref 68–131)
GLUCOSE SERPL-MCNC: 117 MG/DL (ref 70–110)
HBA1C MFR BLD: 5.8 % (ref 4–5.6)
HCT VFR BLD AUTO: 41 % (ref 37–48.5)
HDLC SERPL-MCNC: 52 MG/DL (ref 40–75)
HDLC SERPL: 24.1 % (ref 20–50)
HGB BLD-MCNC: 12.7 G/DL (ref 12–16)
IMM GRANULOCYTES # BLD AUTO: 0.02 K/UL (ref 0–0.04)
IMM GRANULOCYTES NFR BLD AUTO: 0.2 % (ref 0–0.5)
LDLC SERPL CALC-MCNC: 141.2 MG/DL (ref 63–159)
LYMPHOCYTES # BLD AUTO: 2.2 K/UL (ref 1–4.8)
LYMPHOCYTES NFR BLD: 27 % (ref 18–48)
MCH RBC QN AUTO: 27 PG (ref 27–31)
MCHC RBC AUTO-ENTMCNC: 31 G/DL (ref 32–36)
MCV RBC AUTO: 87 FL (ref 82–98)
MONOCYTES # BLD AUTO: 0.7 K/UL (ref 0.3–1)
MONOCYTES NFR BLD: 8.4 % (ref 4–15)
NEUTROPHILS # BLD AUTO: 5.1 K/UL (ref 1.8–7.7)
NEUTROPHILS NFR BLD: 62.4 % (ref 38–73)
NONHDLC SERPL-MCNC: 164 MG/DL
NRBC BLD-RTO: 0 /100 WBC
PLATELET # BLD AUTO: 427 K/UL (ref 150–450)
PMV BLD AUTO: 9.6 FL (ref 9.2–12.9)
POTASSIUM SERPL-SCNC: 4.3 MMOL/L (ref 3.5–5.1)
PROT SERPL-MCNC: 7.2 G/DL (ref 6–8.4)
RBC # BLD AUTO: 4.71 M/UL (ref 4–5.4)
SODIUM SERPL-SCNC: 143 MMOL/L (ref 136–145)
TRIGL SERPL-MCNC: 114 MG/DL (ref 30–150)
TSH SERPL DL<=0.005 MIU/L-ACNC: 1.25 UIU/ML (ref 0.4–4)
WBC # BLD AUTO: 8.19 K/UL (ref 3.9–12.7)

## 2023-08-30 PROCEDURE — 3074F SYST BP LT 130 MM HG: CPT | Mod: CPTII,S$GLB,, | Performed by: FAMILY MEDICINE

## 2023-08-30 PROCEDURE — 80053 COMPREHEN METABOLIC PANEL: CPT | Performed by: FAMILY MEDICINE

## 2023-08-30 PROCEDURE — 84443 ASSAY THYROID STIM HORMONE: CPT | Performed by: FAMILY MEDICINE

## 2023-08-30 PROCEDURE — 36415 COLL VENOUS BLD VENIPUNCTURE: CPT | Mod: PO | Performed by: FAMILY MEDICINE

## 2023-08-30 PROCEDURE — 3074F PR MOST RECENT SYSTOLIC BLOOD PRESSURE < 130 MM HG: ICD-10-PCS | Mod: CPTII,S$GLB,, | Performed by: FAMILY MEDICINE

## 2023-08-30 PROCEDURE — 1159F MED LIST DOCD IN RCRD: CPT | Mod: CPTII,S$GLB,, | Performed by: FAMILY MEDICINE

## 2023-08-30 PROCEDURE — 1159F PR MEDICATION LIST DOCUMENTED IN MEDICAL RECORD: ICD-10-PCS | Mod: CPTII,S$GLB,, | Performed by: FAMILY MEDICINE

## 2023-08-30 PROCEDURE — 99999 PR PBB SHADOW E&M-EST. PATIENT-LVL III: ICD-10-PCS | Mod: PBBFAC,,, | Performed by: FAMILY MEDICINE

## 2023-08-30 PROCEDURE — 3008F BODY MASS INDEX DOCD: CPT | Mod: CPTII,S$GLB,, | Performed by: FAMILY MEDICINE

## 2023-08-30 PROCEDURE — 3078F PR MOST RECENT DIASTOLIC BLOOD PRESSURE < 80 MM HG: ICD-10-PCS | Mod: CPTII,S$GLB,, | Performed by: FAMILY MEDICINE

## 2023-08-30 PROCEDURE — 1160F PR REVIEW ALL MEDS BY PRESCRIBER/CLIN PHARMACIST DOCUMENTED: ICD-10-PCS | Mod: CPTII,S$GLB,, | Performed by: FAMILY MEDICINE

## 2023-08-30 PROCEDURE — 1160F RVW MEDS BY RX/DR IN RCRD: CPT | Mod: CPTII,S$GLB,, | Performed by: FAMILY MEDICINE

## 2023-08-30 PROCEDURE — 99396 PREV VISIT EST AGE 40-64: CPT | Mod: S$GLB,,, | Performed by: FAMILY MEDICINE

## 2023-08-30 PROCEDURE — 80061 LIPID PANEL: CPT | Performed by: FAMILY MEDICINE

## 2023-08-30 PROCEDURE — 83036 HEMOGLOBIN GLYCOSYLATED A1C: CPT | Performed by: FAMILY MEDICINE

## 2023-08-30 PROCEDURE — 3008F PR BODY MASS INDEX (BMI) DOCUMENTED: ICD-10-PCS | Mod: CPTII,S$GLB,, | Performed by: FAMILY MEDICINE

## 2023-08-30 PROCEDURE — 99999 PR PBB SHADOW E&M-EST. PATIENT-LVL III: CPT | Mod: PBBFAC,,, | Performed by: FAMILY MEDICINE

## 2023-08-30 PROCEDURE — 99396 PR PREVENTIVE VISIT,EST,40-64: ICD-10-PCS | Mod: S$GLB,,, | Performed by: FAMILY MEDICINE

## 2023-08-30 PROCEDURE — 3078F DIAST BP <80 MM HG: CPT | Mod: CPTII,S$GLB,, | Performed by: FAMILY MEDICINE

## 2023-08-30 PROCEDURE — 85025 COMPLETE CBC W/AUTO DIFF WBC: CPT | Performed by: FAMILY MEDICINE

## 2023-08-30 RX ORDER — AMLODIPINE BESYLATE 5 MG/1
5 TABLET ORAL DAILY
Qty: 90 TABLET | Refills: 3 | Status: SHIPPED | OUTPATIENT
Start: 2023-08-30

## 2023-08-30 NOTE — PROGRESS NOTES
"Subjective:       Patient ID: Aidee Larose is a 58 y.o. female.    Chief Complaint: Annual Exam    Pt is known to me.  The pt presents for annual wellness exam.   The pt has an ankle sprain.  She has seen ortho and she is about to start PT.  She has been feeling "blah" recently.    She has had no chest pain nor SOB.  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    She sleeps well.  She does not have trouble staying awake during the day.  Discussed health maintenance with pt and will schedule appropriate studies and/or immunizations.        Review of Systems   Constitutional:  Positive for fatigue. Negative for activity change, appetite change and unexpected weight change.   Eyes:  Negative for visual disturbance.   Respiratory:  Negative for cough, chest tightness and shortness of breath.    Cardiovascular:  Negative for chest pain, palpitations and leg swelling.   Gastrointestinal:  Negative for abdominal pain, constipation, diarrhea, nausea and vomiting.   Endocrine: Negative for cold intolerance, heat intolerance and polyuria.   Genitourinary:  Negative for decreased urine volume and dysuria.   Musculoskeletal:  Positive for arthralgias. Negative for back pain and neck pain.   Skin:  Negative for rash.   Neurological:  Negative for numbness and headaches.   Psychiatric/Behavioral: Negative.  Negative for sleep disturbance.        Objective:      Physical Exam  Vitals and nursing note reviewed.   Constitutional:       Appearance: Normal appearance. She is normal weight. She is not ill-appearing.   HENT:      Mouth/Throat:      Mouth: Mucous membranes are moist.      Pharynx: Oropharynx is clear.   Eyes:      Extraocular Movements: Extraocular movements intact.      Pupils: Pupils are equal, round, and reactive to light.   Neck:      Vascular: No carotid bruit.   Cardiovascular:      Rate and Rhythm: Normal rate and regular rhythm.      Pulses: Normal pulses.      Heart sounds: " Normal heart sounds.   Pulmonary:      Effort: Pulmonary effort is normal.      Breath sounds: Normal breath sounds.   Abdominal:      General: There is no distension.      Palpations: Abdomen is soft. There is no mass.      Tenderness: There is no abdominal tenderness. There is no right CVA tenderness or left CVA tenderness.   Musculoskeletal:         General: No tenderness. Normal range of motion.      Cervical back: No tenderness.      Right lower leg: No edema.      Left lower leg: No edema.   Lymphadenopathy:      Cervical: No cervical adenopathy.   Skin:     General: Skin is warm and dry.   Neurological:      General: No focal deficit present.      Mental Status: She is alert and oriented to person, place, and time. Mental status is at baseline.   Psychiatric:         Mood and Affect: Mood normal.         Behavior: Behavior normal.         Assessment:       1. Wellness examination    2. Essential hypertension        Plan:       Aidee was seen today for annual exam.    Diagnoses and all orders for this visit:    Wellness examination  -     CBC Auto Differential; Future  -     Comprehensive Metabolic Panel; Future  -     Hemoglobin A1C; Future  -     Lipid Panel; Future  -     TSH; Future    Essential hypertension  -     amLODIPine (NORVASC) 5 MG tablet; Take 1 tablet (5 mg total) by mouth once daily.      During this visit, I reviewed the pt's history, medications, allergies, and problem list.

## 2023-08-31 ENCOUNTER — CLINICAL SUPPORT (OUTPATIENT)
Dept: REHABILITATION | Facility: HOSPITAL | Age: 58
End: 2023-08-31
Attending: ORTHOPAEDIC SURGERY
Payer: COMMERCIAL

## 2023-08-31 ENCOUNTER — HOSPITAL ENCOUNTER (OUTPATIENT)
Dept: RADIOLOGY | Facility: HOSPITAL | Age: 58
Discharge: HOME | End: 2023-08-31
Attending: FAMILY MEDICINE
Payer: COMMERCIAL

## 2023-08-31 DIAGNOSIS — R26.9 GAIT ABNORMALITY: ICD-10-CM

## 2023-08-31 DIAGNOSIS — S93.491A SPRAIN OF ANTERIOR TALOFIBULAR LIGAMENT OF RIGHT ANKLE, INITIAL ENCOUNTER: ICD-10-CM

## 2023-08-31 DIAGNOSIS — M21.6X1 ACQUIRED CAVOVARUS FOOT DEFORMITY, RIGHT: ICD-10-CM

## 2023-08-31 DIAGNOSIS — M25.571 ACUTE RIGHT ANKLE PAIN: ICD-10-CM

## 2023-08-31 DIAGNOSIS — M25.671 DECREASED RANGE OF MOTION OF RIGHT ANKLE: ICD-10-CM

## 2023-08-31 DIAGNOSIS — Z12.31 OTHER SCREENING MAMMOGRAM: ICD-10-CM

## 2023-08-31 PROCEDURE — 77063 BREAST TOMOSYNTHESIS BI: CPT | Mod: 26,,, | Performed by: RADIOLOGY

## 2023-08-31 PROCEDURE — 77067 SCR MAMMO BI INCL CAD: CPT | Mod: TC,PO

## 2023-08-31 PROCEDURE — 77067 SCR MAMMO BI INCL CAD: CPT | Mod: 26,,, | Performed by: RADIOLOGY

## 2023-08-31 PROCEDURE — 97161 PT EVAL LOW COMPLEX 20 MIN: CPT | Mod: PO | Performed by: PHYSICAL THERAPIST

## 2023-08-31 PROCEDURE — 77063 MAMMO DIGITAL SCREENING BILAT WITH TOMO: ICD-10-PCS | Mod: 26,,, | Performed by: RADIOLOGY

## 2023-08-31 PROCEDURE — 77067 MAMMO DIGITAL SCREENING BILAT WITH TOMO: ICD-10-PCS | Mod: 26,,, | Performed by: RADIOLOGY

## 2023-08-31 NOTE — PATIENT INSTRUCTIONS
[email or web address]

## 2023-08-31 NOTE — PLAN OF CARE
[facility]   Physical Therapy Initial Evaluation      Name: Aidee Hernandez [facility]  Clinic Number: [id number]    Therapy Diagnosis:   Encounter Diagnoses   Name Primary?    Sprain of anterior talofibular ligament of right ankle, initial encounter     Acquired cavovarus foot deformity, right     Gait abnormality     Acute right ankle pain     Decreased range of motion of right ankle         Physician: Varghese Lopez MD    Physician Orders: PT Eval and Treat   Medical Diagnosis from Referral:   Evaluation Date: 8/31/2023  Authorization Period Expiration:   Sprain of anterior talofibular ligament of right ankle, initial encounter  Acquired cavovarus foot deformity, right     Plan of Care Expiration: 07/26/2024  Progress Note Due: 10/28/2023  Visit # / Visits authorized: 1   FOTO: 1/3    Precautions: Standard , HTN    Time In: 0900  Time Out: 1000  Total Appointment Time (timed & untimed codes): 60 minutes    Subjective     Date of onset: 07/08/2023    History of current condition - Aidee reports: having an ankle/foot injury on 07/08/2023 while playing with the grand-kids and fell. Patient recalls getting up and kept working the next day in the house and noticed swelling and pain. No numbness/tingling noted.    Falls:  x 1    Imaging: x-ray: 07/21/2023  No acute, displaced fracture, aggressive osseous abnormality or dislocation. No focal soft tissue abnormality.   Prior Therapy: none  Social History:  lives with their family, one story  Occupation: Banker, seated management  Prior Level of Function: independent  Current Level of Function: modified independent, increase time noted with home management, walking.    Pain:  Current 3/10, worst 10/10, best 1/10   Location: right  lateral ankle  Description: Aching, Dull, Tight, and Variable  Aggravating Factors: Sitting, Standing, and lying on back  Easing Factors: rest and change in position    Patients goals: Decrease pain to right ankle,  walk independently with no painful limitations.     Medical History:   Past Medical History:   Diagnosis Date    Hypertension     Hypothyroidism     Left navicular fracture of foot     Miscarriage     x2 first trimester    PE (pulmonary embolism)     after a foot fracture in     PONV (postoperative nausea and vomiting)     Thyroid disease     hypothyroidism       Surgical History:   Aidee Larose  has a past surgical history that includes Tubal ligation;  section; Pelvic laparoscopy; Laser ablation; breast reduction; Hysterectomy (2013); Oophorectomy (2013); Belt abdominoplasty (2013); Carpal tunnel release (Bilateral,  and ); Total Reduction Mammoplasty (Bilateral, ); and Colonoscopy (N/A, 2018).    Medications:   Aidee has a current medication list which includes the following prescription(s): amlodipine, levothyroxine, meloxicam, multivitamin, and valacyclovir.    Allergies:   Review of patient's allergies indicates:  No Known Allergies     Objective      Posture/ Alignment: pes cavus      Ankle Right Left Pain/Dysfunction    A/PROM A/PROM    DF N/5 10/15    PF 45 50    Eversion 10 20    Inversion 25 32    1st MTP flexion      1st MTP extension        Muscle guarding with right ankle motion.       Right Left Comment   DF: 4/5 5/5    PF: 4-/5 5/5    Eversion: 4/5 5/5    Inversion: 4-/5 5/5    1st MTP Ext: 4+/5 5/5    1st MTP Flex: 4+/5 5/5      MMT:    R  L  Hip flexion   4+/5  5/5  Hip abduction   4-/5  4+/5  Knee flexion   5/5  5/5  Knee extension  5/5  5/5    Special Test:Ankle  (ligamentous):  -Anterior Drawer test-  -Talar tilt test-    (Achilles tendon):  -morrell test-    (DVT):  -Sylvain sign-    (Neuroma):  -Hany Click test-    (Posterior Tibial Nerve):  -Tinels test-    Gait: Patient ambulated 50 ft without AD, antalgic gait (right ankle/foot), independently.  GAIT DEVIATIONS: Aidee amb with no assistive device with decreased step length to  RLE.    Palpation:  Point tenderness to right lateral ankle ATFL, CFL-mild area    Joint Play:  Hypo talus glide (A/P)    OTHER TESTS:   Evaluation    Timed Up and Go  sec    Single Limb Stance R LE 0    Single Limb Stance L LE 0    Self Selected Walking Speed  m/sec    30 second Chair Rise  completed      Minimum standards/norms:    TUG:  < 13.5 seconds/ Males 7.3 sec, Females 8.1 sec  SLS:  26-29 sec  Ages 20-69  Self Selected Walking Speed:  .4-.8m/sec  Limited community ambulator                                                   .8- 1.2  Community ambulator                                                    1.2 m/sec and above  Able to safely cross streets  30 Second Chair Rise:  Ages 60-64  Males 14-19   Females  12-17      Pt/family was provided educational information, including: role of PT, goals for PT, scheduling, insurance. Pt verbalized understanding.      Patient responses to functional health questions that indicate dysfunction were as follows:  Question Response at Intake  Any of your usual work, housework, or school activities. A Little Bit of Difficulty  Your usual hobbies, recreational or sporting activities. A Little Bit of Difficulty  Getting into or out of the bath. No Difficulty  Walking between rooms. No Difficulty  [facility] [place]  INTAKE FUNCTIONAL STATUS SUMMARY (8/31/2023)  Patient: KELSEY MONTERO  ID# [id number]  YOB: 1965  Initial DOS: 8/31/2023  Condition: Ankle  Impairment: Sprains / Strains  Surgery Type: Not Applicable  Surgery Date: Not Applicable  Measure: Lower Extremity Functional Scale  Risk-Adjustment Criteria  Care Type: Orthopedic Condition: Ankle  Severity: Slight (Intake FS: 59) Biological Sex: Female  Payer: Other Age: 58  Acuity: 22 - 90 days Specific Surgical Code  Surgeries: None Prev Exercise: Once or twice a week  Prev Treatment: No Specific Comorbidities  FOTO Patient Outcomes  For more information  about interpretation of this outcomes data report,  contact [email or web address]  Page 1 Printed: 8/31/2023 8:11 PM ET    Insurance Referral: TIANNA LAMAO  Patient reports other health conditions as: BMI over 30, High Blood Pressure, Previous accidents, Prior Surgery  BMI: 37.4 (Height: 59 inches, Weight: 185 lbs)  Exercise prior to onset: Patient completed 20 minutes of exercise once or twice a week  Prescription medicine: Patient is taking prescription medicine for this condition  Surgery: Patient reports no surgeries for this primary condition  Complexity Level: Moderate  Patient responses to functional health questions that indicate dysfunction were as follows:  Putting on your shoes or socks. A Little Bit of Difficulty  Squatting. A Little Bit of Difficulty  Lifting an object, like a bag of groceries from the floor. No Difficulty  Performing light activities around the home. A Little Bit of Difficulty  Performing heavy activities around the home. Moderate Difficulty  Getting into or out of a car. No Difficulty  Walking 2 blocks. A Little Bit of Difficulty  Walking a mile. Moderate Difficulty  Going up or down 10 stairs (about 1 flight of stairs). A Little Bit of Difficulty  Standing for 1 hour. A Little Bit of Difficulty  Sitting for 1 hour. A Little Bit of Difficulty  Running on even ground. Moderate Difficulty  Running on uneven ground. Quite a Bit of Difficulty  Making sharp turns while running fast. Quite a Bit of Difficulty  Hopping. Moderate Difficulty  Rolling over in bed. A Little Bit of Difficulty         Treatment     Total Treatment time (time-based codes) separate from Evaluation: 5 minutes     Aidee received the treatments listed below:      therapeutic exercises to develop strength, endurance, ROM, flexibility, posture, and core stabilization for 5 minutes including:  Glut sets  Clam shells  Bridge  Ankle DF stretch, f/b active DF  Ankle  DF, eversion isometric to isotonic with red band      Patient  Education and Home Exercises     Education provided:   - yes    Written Home Exercises Provided: yes. Exercises were reviewed and Aidee was able to demonstrate them prior to the end of the session.  Aidee demonstrated good  understanding of the education provided. See EMR under Patient Instructions for exercises provided during therapy sessions.    Assessment     Aidee is a 58 y.o. female referred to outpatient Physical Therapy with a medical diagnosis of Sprain of anterior talofibular ligament of right ankle, initial encounter ,Acquired cavovarus foot deformity, right . Patient presents with right ankle/foot pain, gait abnormality, right hip/ankle weakness, decreased right ankle motion.    Problem List: pain, decreased ROM, decreased flexibility, decreased strength, decreased balance and stability, decreased motor control, antalgic gait, inability to participate fully in vocation pursuits, and decreased ability to fully participate in recreational/sports related activities.    Patient prognosis is Good.   Patient will benefit from skilled outpatient Physical Therapy to address the deficits stated above and in the chart below, provide patient /family education, and to maximize patientt's level of independence.     Plan of care discussed with patient: Yes  Patient's spiritual, cultural and educational needs considered and patient is agreeable to the plan of care and goals as stated below:     Anticipated Barriers for therapy: none    Medical Necessity is demonstrated by the following  History  Co-morbidities and personal factors that may impact the plan of care Co-morbidities:   high BMI, HTN, and immunosuppression    Personal Factors:   no deficits     moderate   Examination  Body Structures and Functions, activity limitations and participation restrictions that may impact the plan of care Body Regions:   back  lower extremities  trunk    Body Systems:    ROM  strength  balance  gait  transfers  transitions  motor  "control    Participation Restrictions:   Home management  Community ambulation    Activity limitations:   Learning and applying knowledge  no deficits    General Tasks and Commands  no deficits    Communication  no deficits    Mobility  lifting and carrying objects  walking    Self care  no deficits    Domestic Life  doing house work (cleaning house, washing dishes, laundry)    Interactions/Relationships  no deficits    Life Areas  no deficits    Community and Social Life  no deficits         high   Clinical Presentation stable and uncomplicated low   Decision Making/ Complexity Score: low     Goals:  Short Term Goals: 4 weeks   Improve right ankle motion by 3-5 degrees for ambulatory purposes.  Improve right hip/ankle MMT 1/2 grade for home management.  Report < 3/10 pain to ankle with house-hold ambulation tasks.  Demonstrate SLS > 3" for loading stance purposes.      Long Term Goals: 8 weeks   Independent with HEP with no difficulty.  Return to community ambulation independently with right ankle dorsiflexion > 10 degrees with no painful limitations.  Demonstrate right hip/ankle MMT > 4/5 for home management tasks.  Report > 75% reduction in right ankle painful episodes with work related tasks.    Plan     Plan of care Certification: 8/31/2023 to 10/28/2023.    Outpatient Physical Therapy 2 times weekly for 8 weeks to include the following interventions: Gait Training, Manual Therapy, Moist Heat/ Ice, Neuromuscular Re-ed, Patient Education, Therapeutic Activities, and Therapeutic Exercise.         "

## 2023-09-07 DIAGNOSIS — E03.9 ACQUIRED HYPOTHYROIDISM: ICD-10-CM

## 2023-09-07 RX ORDER — LEVOTHYROXINE SODIUM 50 UG/1
50 TABLET ORAL
Qty: 90 TABLET | Refills: 3 | Status: SHIPPED | OUTPATIENT
Start: 2023-09-07

## 2023-09-07 NOTE — TELEPHONE ENCOUNTER
No care due was identified.  [facility] Embedded Care Due Messages. Reference number: [id number].   9/07/2023 12:09:37 AM CDT

## 2023-09-07 NOTE — TELEPHONE ENCOUNTER
Refill Decision Note   Aidee Larose  is requesting a refill authorization.  Brief Assessment and Rationale for Refill:  Approve     Medication Therapy Plan:         Comments:     Note composed:10:20 AM 09/07/2023             Appointments     Last Visit   8/30/2023 STACIE Evans MD   Next Visit   Visit date not found STACIE Evans MD

## 2023-09-11 ENCOUNTER — CLINICAL SUPPORT (OUTPATIENT)
Dept: REHABILITATION | Facility: HOSPITAL | Age: 58
End: 2023-09-11
Payer: COMMERCIAL

## 2023-09-11 DIAGNOSIS — M25.571 ACUTE RIGHT ANKLE PAIN: ICD-10-CM

## 2023-09-11 DIAGNOSIS — M25.671 DECREASED RANGE OF MOTION OF RIGHT ANKLE: ICD-10-CM

## 2023-09-11 DIAGNOSIS — R26.9 GAIT ABNORMALITY: Primary | ICD-10-CM

## 2023-09-11 PROCEDURE — 97110 THERAPEUTIC EXERCISES: CPT | Mod: PO,CQ

## 2023-09-11 PROCEDURE — 97112 NEUROMUSCULAR REEDUCATION: CPT | Mod: PO,CQ

## 2023-09-11 PROCEDURE — 97140 MANUAL THERAPY 1/> REGIONS: CPT | Mod: PO,CQ

## 2023-09-11 NOTE — PROGRESS NOTES
[facility]   Physical Therapy Treatment Note     Name: Aidee Hernandez [facility]  Clinic Number: [id number]    Therapy Diagnosis:   Encounter Diagnoses   Name Primary?    Gait abnormality Yes    Acute right ankle pain     Decreased range of motion of right ankle      Physician: Varghese Lopez MD    Visit Date: 9/11/2023  Physician Orders: PT Eval and Treat   Medical Diagnosis from Referral:   Evaluation Date: 8/31/2023  Authorization Period Expiration:   Sprain of anterior talofibular ligament of right ankle, initial encounter  Acquired cavovarus foot deformity, right    Plan of Care Expiration: 07/26/2024  Progress Note Due: 10/28/2023  Visit # / Visits authorized: 1/20  FOTO: 1/3    PTA Visit #: 1/5     Precautions: Standard and HTN      Time In: 0900 AM  Time Out: 0955 AM  Total Billable Time: 40 minutes (1:1 with PTA and tech)    SUBJECTIVE     Pt reports: she stepped the wrong way onto ankle yesterday causing pain and soreness. Patient states she is feeling better today but she does have some soreness. She was compliant with home exercise program.  Response to previous treatment: no adverse effect  Functional change: too soon to determine     Pain: 1/10  Location: right ankle    OBJECTIVE     Objective Measures updated at progress report unless specified.     Treatment      Aidee received the treatments listed below:      therapeutic exercises to develop strength, endurance, ROM, flexibility, posture, and core stabilization for 15 minutes including:  Upright bike x 5 minutes  Standing gastroc stretch on incline x 1 minute x 2   Ankle DF, eversion with red TB 2x10  Seated rockerboard: PF/DF x 2 minutes  Standing heel raises 2x10  Standing hip abduction 2x10    manual therapy techniques: Joint mobilizations were applied to the: (R) ankle for 10 minutes, including:  A/P mobilizations to improve DF     neuromuscular re-education activities to improve: Balance, Coordination,  "Proprioception, and Posture for 25 minutes. The following activities were included:  Supine bridge 2x10  S/L clamshells (red TB) 2x10  Seated arch doming 2x10  Toe yoga x 2 minutes  Tandem stance 20 sec x 3   SL stance x 15 sec x 2     Patient Education and Home Exercises     Home Exercises Provided and Patient Education Provided   Aidee received the treatments listed below:      Education provided:   - HEP compliance     Written Home Exercises Provided: Patient instructed to cont prior HEP. Exercises were reviewed and Aidee was able to demonstrate them prior to the end of the session.  Aidee demonstrated good  understanding of the education provided. See EMR under Patient Instructions for exercises provided during therapy sessions    ASSESSMENT     Therapeutic effect easily achieved with gastroc stretching. Patient struggled with intrinsic muscle control of the foot especially to yoga. No adverse effects to balance activities today but she did struggle in single limb stance. Tactile cues for resisted eversion and DF to limit accessory motion.     Aidee Is progressing well towards her goals.   Pt prognosis is Good.     Pt will continue to benefit from skilled outpatient physical therapy to address the deficits listed in the problem list box on initial evaluation, provide pt/family education and to maximize pt's level of independence in the home and community environment.     Pt's spiritual, cultural and educational needs considered and pt agreeable to plan of care and goals.     Anticipated barriers to physical therapy: none    Goals:   Short Term Goals: 4 weeks   Improve right ankle motion by 3-5 degrees for ambulatory purposes.  Improve right hip/ankle MMT 1/2 grade for home management.  Report < 3/10 pain to ankle with house-hold ambulation tasks.  Demonstrate SLS > 3" for loading stance purposes.      Long Term Goals: 8 weeks   Independent with HEP with no difficulty.  Return to community ambulation " independently with right ankle dorsiflexion > 10 degrees with no painful limitations.  Demonstrate right hip/ankle MMT > 4/5 for home management tasks.  Report > 75% reduction in right ankle painful episodes with work related tasks.    PLAN     Continue current POC with emphasis on improving ankle stability and neuromuscular control.     Kellie Broderick, PTA

## 2023-09-13 ENCOUNTER — CLINICAL SUPPORT (OUTPATIENT)
Dept: REHABILITATION | Facility: HOSPITAL | Age: 58
End: 2023-09-13
Payer: COMMERCIAL

## 2023-09-13 DIAGNOSIS — R26.9 GAIT ABNORMALITY: Primary | ICD-10-CM

## 2023-09-13 DIAGNOSIS — M25.571 ACUTE RIGHT ANKLE PAIN: ICD-10-CM

## 2023-09-13 DIAGNOSIS — M25.671 DECREASED RANGE OF MOTION OF RIGHT ANKLE: ICD-10-CM

## 2023-09-13 PROCEDURE — 97110 THERAPEUTIC EXERCISES: CPT | Mod: PO,CQ

## 2023-09-13 PROCEDURE — 97112 NEUROMUSCULAR REEDUCATION: CPT | Mod: PO,CQ,97

## 2023-09-13 PROCEDURE — 97140 MANUAL THERAPY 1/> REGIONS: CPT | Mod: PO,CQ,97

## 2023-09-13 NOTE — PROGRESS NOTES
[facility]   Physical Therapy Treatment Note     Name: Aidee Hernandez [facility]  Clinic Number: [id number]    Therapy Diagnosis:   Encounter Diagnoses   Name Primary?    Gait abnormality Yes    Acute right ankle pain     Decreased range of motion of right ankle        Physician: Varghese Lopez MD    Visit Date: 9/13/2023  Physician Orders: PT Eval and Treat   Medical Diagnosis from Referral:   Evaluation Date: 8/31/2023  Authorization Period Expiration:   Sprain of anterior talofibular ligament of right ankle, initial encounter  Acquired cavovarus foot deformity, right    Plan of Care Expiration: 07/26/2024  Progress Note Due: 10/28/2023  Visit # / Visits authorized: 2/20  FOTO: 1/3    PTA Visit #: 2/5     Precautions: Standard and HTN      Time In: 0803 AM  Time Out: 0900 AM  Total Billable Time: 40 minutes (1:1 with PTA and tech)    SUBJECTIVE     Pt reports: her ankle is feeling good this morning. Patient states her ankle is currently pain free. She was compliant with home exercise program.  Response to previous treatment: no adverse effect  Functional change: too soon to determine     Pain: 0/10  Location: right ankle    OBJECTIVE     Objective Measures updated at progress report unless specified.     Treatment      Aidee received the treatments listed below:      therapeutic exercises to develop strength, endurance, ROM, flexibility, posture, and core stabilization for 15 minutes including:  Upright bike x 5 minutes  Standing gastroc stretch on incline x 1 minute x 2   Ankle DF, eversion with red TB 2x10  Seated rockerboard: PF/DF x 2 minutes  Standing heel raises 2x10  Standing hip abduction 2x10    manual therapy techniques: Joint mobilizations were applied to the: (R) ankle for 10 minutes, including:  A/P mobilizations to improve DF     neuromuscular re-education activities to improve: Balance, Coordination, Proprioception, and Posture for 25 minutes. The following activities  "were included:  Supine bridge 2x10  S/L clamshells (red TB) 2x10  Seated arch doming 2x10  Toe yoga x 2 minutes  Tandem stance 20 sec x 3   SL stance x 15 sec x 2     Patient Education and Home Exercises     Home Exercises Provided and Patient Education Provided   Aidee received the treatments listed below:      Education provided:   - HEP compliance     Written Home Exercises Provided: Patient instructed to cont prior HEP. Exercises were reviewed and Aidee was able to demonstrate them prior to the end of the session.  Aidee demonstrated good  understanding of the education provided. See EMR under Patient Instructions for exercises provided during therapy sessions    ASSESSMENT     DF mobility improving and she is demonstrating improvements in AROM against resistance. Difficulty controlling toe yoga indicating decreased motor control and coordination of foot intrinsic musculature. Patient able to maintain SL stance without complaints of ankle pain but instability noted.     Aidee Is progressing well towards her goals.   Pt prognosis is Good.     Pt will continue to benefit from skilled outpatient physical therapy to address the deficits listed in the problem list box on initial evaluation, provide pt/family education and to maximize pt's level of independence in the home and community environment.     Pt's spiritual, cultural and educational needs considered and pt agreeable to plan of care and goals.     Anticipated barriers to physical therapy: none    Goals:   Short Term Goals: 4 weeks   Improve right ankle motion by 3-5 degrees for ambulatory purposes.  Improve right hip/ankle MMT 1/2 grade for home management.  Report < 3/10 pain to ankle with house-hold ambulation tasks.  Demonstrate SLS > 3" for loading stance purposes.      Long Term Goals: 8 weeks   Independent with HEP with no difficulty.  Return to community ambulation independently with right ankle dorsiflexion > 10 degrees with no painful " limitations.  Demonstrate right hip/ankle MMT > 4/5 for home management tasks.  Report > 75% reduction in right ankle painful episodes with work related tasks.    PLAN     Continue current POC with emphasis on improving ankle stability and neuromuscular control.     Kellie Broderick, PTA

## 2024-06-27 ENCOUNTER — OFFICE VISIT (OUTPATIENT)
Dept: PODIATRY | Facility: CLINIC | Age: 59
End: 2024-06-27
Payer: COMMERCIAL

## 2024-06-27 DIAGNOSIS — S86.301A INJURY OF PERONEAL TENDON OF RIGHT FOOT, INITIAL ENCOUNTER: Primary | ICD-10-CM

## 2024-06-27 PROCEDURE — 1159F MED LIST DOCD IN RCRD: CPT | Mod: CPTII,S$GLB,, | Performed by: STUDENT IN AN ORGANIZED HEALTH CARE EDUCATION/TRAINING PROGRAM

## 2024-06-27 PROCEDURE — 99999 PR PBB SHADOW E&M-EST. PATIENT-LVL II: CPT | Mod: PBBFAC,,, | Performed by: STUDENT IN AN ORGANIZED HEALTH CARE EDUCATION/TRAINING PROGRAM

## 2024-06-27 PROCEDURE — 99204 OFFICE O/P NEW MOD 45 MIN: CPT | Mod: S$GLB,,, | Performed by: STUDENT IN AN ORGANIZED HEALTH CARE EDUCATION/TRAINING PROGRAM

## 2024-06-27 PROCEDURE — 1160F RVW MEDS BY RX/DR IN RCRD: CPT | Mod: CPTII,S$GLB,, | Performed by: STUDENT IN AN ORGANIZED HEALTH CARE EDUCATION/TRAINING PROGRAM

## 2024-06-27 NOTE — PROGRESS NOTES
Subjective:      Patient ID: Aidee Larose is a 59 y.o. female.    Chief Complaint: No chief complaint on file.    Ms. Larose presents today with complaints of R ankle pain. She suffered an injury about 1 year ago when she was helping her family move. She stepped up and down from a moving truck all day and had severe ankle pain. She was evaluated by ortho and was found to have an ATFL sprain. She was referred to PT which she was only able to go for 4 sessions before switching insurances. She has had continued R ankle pain since then. Pain, swelling is worse after activity. She has a lace up ankle brace that she will occasionally use and a tall boot at home.     Review of Systems   Musculoskeletal:         R ankle pain   All other systems reviewed and are negative.          Objective:      Physical Exam  Cardiovascular:      Pulses:           Dorsalis pedis pulses are 2+ on the right side.        Posterior tibial pulses are 2+ on the right side.   Musculoskeletal:        Feet:    Feet:      Comments: There is a large, palpable mass just proximal to the lateral malleolus along the peroneal tendon. There is tenderness to palpation of the tendon to the retromalleolar groove. There is pain with provocation of the peroneal tendons. No pain along the ankle ligaments. No instability appreciated. No popping of the peroneals with foot ROM.              Assessment:       Encounter Diagnosis   Name Primary?    Injury of peroneal tendon of right foot, initial encounter Yes         Plan:       Diagnoses and all orders for this visit:    Injury of peroneal tendon of right foot, initial encounter  -     MRI Ankle W WO Contrast Right; Future      I counseled the patient on her conditions, their implications and medical management.    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 I discussed the plan of care with Ms. Larose. We can proceed with  conservative measures to include offloading with a boot and PT and if there is no improvement, we can proceed with surgical repair of the peroneals. MRI ordered to evaluate the tendon given the long standing injury.    F/u pending MRI results.     Luis Osullivan DPM

## 2024-07-11 ENCOUNTER — PATIENT MESSAGE (OUTPATIENT)
Dept: PODIATRY | Facility: CLINIC | Age: 59
End: 2024-07-11
Payer: COMMERCIAL

## 2024-07-18 NOTE — TELEPHONE ENCOUNTER
Sent pt a [employer] message letting her know we scheduled her for 7/23 at 1:20 per Dr. Osullivan's request

## 2024-07-23 ENCOUNTER — OFFICE VISIT (OUTPATIENT)
Dept: PODIATRY | Facility: CLINIC | Age: 59
End: 2024-07-23
Payer: COMMERCIAL

## 2024-07-23 VITALS — BODY MASS INDEX: 37.51 KG/M2 | HEIGHT: 59 IN | WEIGHT: 186.06 LBS

## 2024-07-23 DIAGNOSIS — S86.301A INJURY OF PERONEAL TENDON OF RIGHT FOOT, INITIAL ENCOUNTER: Primary | ICD-10-CM

## 2024-07-23 DIAGNOSIS — M67.88 PERONEAL TENDINOSIS, RIGHT: ICD-10-CM

## 2024-07-23 PROCEDURE — 1159F MED LIST DOCD IN RCRD: CPT | Mod: CPTII,S$GLB,, | Performed by: STUDENT IN AN ORGANIZED HEALTH CARE EDUCATION/TRAINING PROGRAM

## 2024-07-23 PROCEDURE — 3008F BODY MASS INDEX DOCD: CPT | Mod: CPTII,S$GLB,, | Performed by: STUDENT IN AN ORGANIZED HEALTH CARE EDUCATION/TRAINING PROGRAM

## 2024-07-23 PROCEDURE — 99999 PR PBB SHADOW E&M-EST. PATIENT-LVL III: CPT | Mod: PBBFAC,,, | Performed by: STUDENT IN AN ORGANIZED HEALTH CARE EDUCATION/TRAINING PROGRAM

## 2024-07-23 PROCEDURE — 99214 OFFICE O/P EST MOD 30 MIN: CPT | Mod: S$GLB,,, | Performed by: STUDENT IN AN ORGANIZED HEALTH CARE EDUCATION/TRAINING PROGRAM

## 2024-07-23 PROCEDURE — 1160F RVW MEDS BY RX/DR IN RCRD: CPT | Mod: CPTII,S$GLB,, | Performed by: STUDENT IN AN ORGANIZED HEALTH CARE EDUCATION/TRAINING PROGRAM

## 2024-08-06 ENCOUNTER — CLINICAL SUPPORT (OUTPATIENT)
Dept: REHABILITATION | Facility: HOSPITAL | Age: 59
End: 2024-08-06
Attending: STUDENT IN AN ORGANIZED HEALTH CARE EDUCATION/TRAINING PROGRAM
Payer: COMMERCIAL

## 2024-08-06 DIAGNOSIS — R26.89 FUNCTIONAL GAIT ABNORMALITY: Primary | ICD-10-CM

## 2024-08-06 DIAGNOSIS — M67.88 PERONEAL TENDINOSIS, RIGHT: ICD-10-CM

## 2024-08-06 DIAGNOSIS — S86.301A INJURY OF PERONEAL TENDON OF RIGHT FOOT, INITIAL ENCOUNTER: ICD-10-CM

## 2024-08-06 DIAGNOSIS — R29.898 ANKLE WEAKNESS: ICD-10-CM

## 2024-08-06 PROBLEM — M25.671 DECREASED RANGE OF MOTION OF RIGHT ANKLE: Status: RESOLVED | Noted: 2023-08-31 | Resolved: 2024-08-06

## 2024-08-06 PROBLEM — R26.9 GAIT ABNORMALITY: Status: RESOLVED | Noted: 2023-08-31 | Resolved: 2024-08-06

## 2024-08-06 PROBLEM — M25.571 ACUTE RIGHT ANKLE PAIN: Status: RESOLVED | Noted: 2023-08-31 | Resolved: 2024-08-06

## 2024-08-06 PROCEDURE — 97112 NEUROMUSCULAR REEDUCATION: CPT | Mod: PO | Performed by: PHYSICAL THERAPIST

## 2024-08-06 PROCEDURE — 97161 PT EVAL LOW COMPLEX 20 MIN: CPT | Mod: PO | Performed by: PHYSICAL THERAPIST

## 2024-08-12 NOTE — PROGRESS NOTES
[facility]   Physical Therapy Treatment Note     Name: Aidee Hernandez [facility]  Clinic Number: [id number]    Therapy Diagnosis:   Encounter Diagnoses   Name Primary?    Functional gait abnormality Yes    Ankle weakness      Physician: Luis Osullivan DPM    Visit Date: 8/13/2024  Physician Orders: PT Eval and Treat    Medical Diagnosis from Referral:   Injury of peroneal tendon of right foot, initial encounter   Peroneal tendinosis, right  Evaluation Date: 8/6/2024  Authorization Period Expiration: 12/31/2024  Plan of Care Expiration: 10/04/2024  Progress Note Due: 09/06/2024  Visit # / Visits authorized: 1/7  FOTO: 1/3    PTA Visit #: 1/5     Precautions: Standard     Time In: 0805 AM  Time Out: 0858 AM  Total Billable Time: 35 minutes (1:1 with PTA and tech)    SUBJECTIVE     Pt reports: her ankle is okay this morning. Patient states her pain usually increases as the day goes on. She was compliant with home exercise program.  Response to previous treatment: no adverse effects   Functional change: too soon to determine     Pain: 2/10  Location: right ankle    OBJECTIVE     Objective Measures updated at progress report unless specified.     Treatment     Aidee received the treatments listed below:      therapeutic exercises to develop strength, endurance, ROM, flexibility, posture, and core stabilization for 15 minutes including:  Recumbent bike x 5 minutes   Long sitting gastroc stretch with strap x 1 minute x 3   Long sitting PF with green TB 3x10    manual therapy techniques: Myofacial release, Soft tissue Mobilization, and Friction Massage were applied to the: (R) ankle for 00 minutes, including:  NP Today    neuromuscular re-education activities to improve: Balance, Coordination, Proprioception, and Posture for 35 minutes. The following activities were included:  Ankle circles: CW/CCW x 20 ea direction  Isometric Eversion: PTA providing resistance 3x10  Seated towel crunches x 2  "minutes, 3 sec hold   Seated arch doming x 2 minutes, 3 sec hold   S/L hip abduction 2x10    Narrow base on air ex pad x 1 minute x 2   Tandem stance 30 sec x 3   SL stance 15 sec x 2     Patient Education and Home Exercises     Home Exercises Provided and Patient Education Provided     Education provided:   - HEP compliance    Written Home Exercises Provided: Patient instructed to cont prior HEP. Exercises were reviewed and Aidee was able to demonstrate them prior to the end of the session.  Aidee demonstrated good  understanding of the education provided. See EMR under Patient Instructions for exercises provided during therapy sessions    ASSESSMENT     Aidee able to perform isometric eversion without complaints of pain but cues for proper movement pattern needed initially. Patient challenged by intrinsic muscle activation, especially arch doming. Progressed to balance without complaints of pain but decreased stability in single limb stance noted.     Aidee Is progressing well towards her goals.   Pt prognosis is Good.     Pt will continue to benefit from skilled outpatient physical therapy to address the deficits listed in the problem list box on initial evaluation, provide pt/family education and to maximize pt's level of independence in the home and community environment.     Pt's spiritual, cultural and educational needs considered and pt agreeable to plan of care and goals.     Anticipated barriers to physical therapy: none    Goals:   Short Term Goals: 4 weeks   Independent with HEP with < 3/10 pain.  Improve right ankle DF 3-5 degrees for ambulatory tasks,  Improve right hip/ankle MMT 1/2 grade for home management tasks.  Demonstrate SLS > 3" of RLE for balance purposes.  Demonstrated standing: PF/inversion independently with no pain.     Long Term Goals: 8 weeks   Independent with HEP with no painful limitations.  Demonstrate right ankle dorsiflexion> 10 degrees actively for ambulatory " purposes.  Demonstrate right hip/ankle MMT > 4+/5 for standing tolerance tasks > 10'  Return to community ambulation independently with 50% reduction in right ankle/foot episodes.     PLAN     Continue current POC with emphasis on LE function and stability.     Kellie Broderick, PTA

## 2024-08-13 ENCOUNTER — CLINICAL SUPPORT (OUTPATIENT)
Dept: REHABILITATION | Facility: HOSPITAL | Age: 59
End: 2024-08-13
Payer: COMMERCIAL

## 2024-08-13 DIAGNOSIS — R29.898 ANKLE WEAKNESS: ICD-10-CM

## 2024-08-13 DIAGNOSIS — R26.89 FUNCTIONAL GAIT ABNORMALITY: Primary | ICD-10-CM

## 2024-08-13 PROCEDURE — 97112 NEUROMUSCULAR REEDUCATION: CPT | Mod: PO,CQ

## 2024-08-13 PROCEDURE — 97110 THERAPEUTIC EXERCISES: CPT | Mod: PO,CQ

## 2024-08-20 ENCOUNTER — CLINICAL SUPPORT (OUTPATIENT)
Dept: REHABILITATION | Facility: HOSPITAL | Age: 59
End: 2024-08-20
Payer: COMMERCIAL

## 2024-08-20 DIAGNOSIS — R26.89 FUNCTIONAL GAIT ABNORMALITY: Primary | ICD-10-CM

## 2024-08-20 DIAGNOSIS — R29.898 ANKLE WEAKNESS: ICD-10-CM

## 2024-08-20 PROCEDURE — 97110 THERAPEUTIC EXERCISES: CPT | Mod: PO,CQ

## 2024-08-20 PROCEDURE — 97112 NEUROMUSCULAR REEDUCATION: CPT | Mod: PO,CQ

## 2024-08-20 NOTE — PROGRESS NOTES
[facility]   Physical Therapy Treatment Note     Name: Aidee Hernandez [facility]  Clinic Number: [id number]    Therapy Diagnosis:   Encounter Diagnoses   Name Primary?    Functional gait abnormality Yes    Ankle weakness      Physician: Luis Osullivan DPM    Visit Date: 8/20/2024  Physician Orders: PT Eval and Treat    Medical Diagnosis from Referral:   Injury of peroneal tendon of right foot, initial encounter   Peroneal tendinosis, right  Evaluation Date: 8/6/2024  Authorization Period Expiration: 12/31/2024  Plan of Care Expiration: 10/04/2024  Progress Note Due: 09/06/2024  Visit # / Visits authorized: 2/7  FOTO: 1/3    PTA Visit #: 1/5     Precautions: Standard     Time In: 0815 AM (patient late)  Time Out: 0900 AM  Total Billable Time: 45 minutes    SUBJECTIVE     Pt reports: her ankle is feeling better. Patient states she is noticing positive results with the exercise. She was compliant with home exercise program.  Response to previous treatment: no adverse effects   Functional change: too soon to determine     Pain: 2/10  Location: right ankle    OBJECTIVE     Objective Measures updated at progress report unless specified.     Treatment     Aidee received the treatments listed below:      therapeutic exercises to develop strength, endurance, ROM, flexibility, posture, and core stabilization for 10 minutes including:  Recumbent bike x 5 minutes   Standing gastroc stretch on incline 1 minute x 3   Long sitting PF with green TB 3x10 (slow return to DF)     manual therapy techniques: Myofacial release, Soft tissue Mobilization, and Friction Massage were applied to the: (R) ankle for 00 minutes, including:  NP Today    neuromuscular re-education activities to improve: Balance, Coordination, Proprioception, and Posture for 35 minutes. The following activities were included:  Ankle circles: CW/CCW x 20 ea direction  Isometric Eversion: PTA providing resistance 3x10  Eversion against  "red TB 2x10  Seated towel crunches x 2 minutes, 3 sec hold   Seated arch doming x 2 minutes, 3 sec hold   S/L hip abduction 2x10    Standing heel raise 2x10  Narrow base on air ex pad x 1 minute x 2   Tandem stance 30 sec x 3   SL stance 15 sec x 2     Patient Education and Home Exercises     Home Exercises Provided and Patient Education Provided     Education provided:   - HEP compliance    Written Home Exercises Provided: Patient instructed to cont prior HEP. Exercises were reviewed and Aidee was able to demonstrate them prior to the end of the session.  Aidee demonstrated good  understanding of the education provided. See EMR under Patient Instructions for exercises provided during therapy sessions    ASSESSMENT     Aidee able to perform isotonic eversion against red TB without complaints of ankle pain. Patient requiring tactile cueing to maintain proper sidelying position in order to achieve appropriate gluteus medius activation during hip abduction. Ability to achieve proper arch dome improves with repetition. Good tolerance to standing heel raises without complaints of pain. Continued focused on neuromuscular and intrinsic control.     Aidee Is progressing well towards her goals.   Pt prognosis is Good.     Pt will continue to benefit from skilled outpatient physical therapy to address the deficits listed in the problem list box on initial evaluation, provide pt/family education and to maximize pt's level of independence in the home and community environment.     Pt's spiritual, cultural and educational needs considered and pt agreeable to plan of care and goals.     Anticipated barriers to physical therapy: none    Goals:   Short Term Goals: 4 weeks   Independent with HEP with < 3/10 pain.  Improve right ankle DF 3-5 degrees for ambulatory tasks,  Improve right hip/ankle MMT 1/2 grade for home management tasks.  Demonstrate SLS > 3" of RLE for balance purposes.  Demonstrated standing: PF/inversion " independently with no pain.     Long Term Goals: 8 weeks   Independent with HEP with no painful limitations.  Demonstrate right ankle dorsiflexion> 10 degrees actively for ambulatory purposes.  Demonstrate right hip/ankle MMT > 4+/5 for standing tolerance tasks > 10'  Return to community ambulation independently with 50% reduction in right ankle/foot episodes.     PLAN     Continue current POC with emphasis on LE function and stability.     Kellie Broderick, PTA

## 2024-08-22 ENCOUNTER — OFFICE VISIT (OUTPATIENT)
Dept: PODIATRY | Facility: CLINIC | Age: 59
End: 2024-08-22
Payer: COMMERCIAL

## 2024-08-22 VITALS — WEIGHT: 186.06 LBS | HEIGHT: 59 IN | BODY MASS INDEX: 37.51 KG/M2

## 2024-08-22 DIAGNOSIS — M67.88 PERONEAL TENDINOSIS, RIGHT: ICD-10-CM

## 2024-08-22 DIAGNOSIS — S86.301A INJURY OF PERONEAL TENDON OF RIGHT FOOT, INITIAL ENCOUNTER: Primary | ICD-10-CM

## 2024-08-22 PROCEDURE — 1160F RVW MEDS BY RX/DR IN RCRD: CPT | Mod: CPTII,S$GLB,, | Performed by: STUDENT IN AN ORGANIZED HEALTH CARE EDUCATION/TRAINING PROGRAM

## 2024-08-22 PROCEDURE — 1159F MED LIST DOCD IN RCRD: CPT | Mod: CPTII,S$GLB,, | Performed by: STUDENT IN AN ORGANIZED HEALTH CARE EDUCATION/TRAINING PROGRAM

## 2024-08-22 PROCEDURE — 99999 PR PBB SHADOW E&M-EST. PATIENT-LVL III: CPT | Mod: PBBFAC,,, | Performed by: STUDENT IN AN ORGANIZED HEALTH CARE EDUCATION/TRAINING PROGRAM

## 2024-08-22 PROCEDURE — 99213 OFFICE O/P EST LOW 20 MIN: CPT | Mod: S$GLB,,, | Performed by: STUDENT IN AN ORGANIZED HEALTH CARE EDUCATION/TRAINING PROGRAM

## 2024-08-22 PROCEDURE — 3008F BODY MASS INDEX DOCD: CPT | Mod: CPTII,S$GLB,, | Performed by: STUDENT IN AN ORGANIZED HEALTH CARE EDUCATION/TRAINING PROGRAM

## 2024-08-22 NOTE — PROGRESS NOTES
Subjective:      Patient ID: Aidee Larose is a 59 y.o. female.    Chief Complaint: Follow-up (1 month f/u, right ankle)    Ms. Larose presents today with complaints of R ankle pain. She suffered an injury about 1 year ago when she was helping her family move. She stepped up and down from a moving truck all day and had severe ankle pain. She was evaluated by ortho and was found to have an ATFL sprain. She was referred to PT which she was only able to go for 4 sessions before switching insurances. She has had continued R ankle pain since then. Pain, swelling is worse after activity. She has a lace up ankle brace that she will occasionally use and a tall boot at home.     07/23/2024 patient returns today for review of the MRI of her right ankle.  She continues have similar amounts of pain.    8/22/24:  She has been doing PT and has gotten significant improvement.     Review of Systems   Musculoskeletal:         R ankle pain   All other systems reviewed and are negative.          Objective:      Physical Exam  Cardiovascular:      Pulses:           Dorsalis pedis pulses are 2+ on the right side.        Posterior tibial pulses are 2+ on the right side.   Musculoskeletal:        Feet:    Feet:      Comments: There is a large, palpable mass just proximal to the lateral malleolus along the peroneal tendon. There is tenderness to palpation of the tendon to the retromalleolar groove. There is pain with provocation of the peroneal tendons. No pain along the ankle ligaments. No instability appreciated. No popping of the peroneals with foot ROM.    8/22/24:  The large palpable mass is much smaller and the edema is much less today. Palpation of the peroneal yields minor pain. Provocation of the tendon is still moderate.          Assessment:       Encounter Diagnoses   Name Primary?    Injury of peroneal tendon of right foot, initial encounter Yes    Peroneal tendinosis, right            Plan:       Aidee was seen  today for follow-up.    Diagnoses and all orders for this visit:    Injury of peroneal tendon of right foot, initial encounter    Peroneal tendinosis, right        I counseled the patient on her conditions, their implications and medical management.    I reviewed the MRI with the patient showing interstitial tearing of the peroneal tendons as well as peroneal tendinosis.  I explained to the patient that we could continue insert treatments or explore surgical options.  The sooner she would be able to have the surgery is some time in early September.  I recommend that she go ahead and start physical therapy for dry needling and strengthening to see if she gets any improvement before the surgery.  If she does get improvement in 1 month that we can continue physical therapy and delay surgery.  Patient is a fall about a month.    8/22/24:  Continue PT as she is progressing well. Follow up 1 month.    Luis Osullivan DPM

## 2024-08-26 ENCOUNTER — OFFICE VISIT (OUTPATIENT)
Dept: FAMILY MEDICINE | Facility: CLINIC | Age: 59
End: 2024-08-26
Payer: COMMERCIAL

## 2024-08-26 VITALS
BODY MASS INDEX: 36.93 KG/M2 | OXYGEN SATURATION: 98 % | WEIGHT: 183.19 LBS | HEIGHT: 59 IN | SYSTOLIC BLOOD PRESSURE: 132 MMHG | DIASTOLIC BLOOD PRESSURE: 86 MMHG | HEART RATE: 87 BPM

## 2024-08-26 DIAGNOSIS — I10 ESSENTIAL HYPERTENSION: ICD-10-CM

## 2024-08-26 DIAGNOSIS — Z00.00 WELLNESS EXAMINATION: Primary | ICD-10-CM

## 2024-08-26 DIAGNOSIS — E03.9 ACQUIRED HYPOTHYROIDISM: ICD-10-CM

## 2024-08-26 DIAGNOSIS — D17.1 LIPOMA OF TORSO: ICD-10-CM

## 2024-08-26 PROCEDURE — 99999 PR PBB SHADOW E&M-EST. PATIENT-LVL III: CPT | Mod: PBBFAC,,, | Performed by: FAMILY MEDICINE

## 2024-08-26 PROCEDURE — 3075F SYST BP GE 130 - 139MM HG: CPT | Mod: CPTII,S$GLB,, | Performed by: FAMILY MEDICINE

## 2024-08-26 PROCEDURE — 3008F BODY MASS INDEX DOCD: CPT | Mod: CPTII,S$GLB,, | Performed by: FAMILY MEDICINE

## 2024-08-26 PROCEDURE — 1159F MED LIST DOCD IN RCRD: CPT | Mod: CPTII,S$GLB,, | Performed by: FAMILY MEDICINE

## 2024-08-26 PROCEDURE — 99396 PREV VISIT EST AGE 40-64: CPT | Mod: S$GLB,,, | Performed by: FAMILY MEDICINE

## 2024-08-26 PROCEDURE — 3079F DIAST BP 80-89 MM HG: CPT | Mod: CPTII,S$GLB,, | Performed by: FAMILY MEDICINE

## 2024-08-26 PROCEDURE — 1160F RVW MEDS BY RX/DR IN RCRD: CPT | Mod: CPTII,S$GLB,, | Performed by: FAMILY MEDICINE

## 2024-08-26 RX ORDER — AMLODIPINE BESYLATE 5 MG/1
5 TABLET ORAL DAILY
Qty: 90 TABLET | Refills: 3 | Status: SHIPPED | OUTPATIENT
Start: 2024-08-26

## 2024-08-26 NOTE — PROGRESS NOTES
Subjective:       Patient ID: Aidee Larose is a 59 y.o. female.    Chief Complaint: Annual Exam    Pt is known to me.  The pt presents for annual wellness exam.  The pt is in PT for an ankle injury.  She is also having accupuncture.  She is trying to avoid surgery.  Otherwise she is doing well.   She has a spot on her back that she wants checked.  She remains concerned about her weight.  She has not been very active since her ankle injury.    Discussed health maintenance with pt and will schedule appropriate studies and/or immunizations.        Review of Systems    Objective:      Physical Exam  Vitals and nursing note reviewed.   Constitutional:       Appearance: Normal appearance. She is normal weight. She is not ill-appearing.   HENT:      Mouth/Throat:      Mouth: Mucous membranes are moist.      Pharynx: Oropharynx is clear.   Eyes:      Extraocular Movements: Extraocular movements intact.      Pupils: Pupils are equal, round, and reactive to light.   Neck:      Vascular: No carotid bruit.   Cardiovascular:      Rate and Rhythm: Normal rate and regular rhythm.      Pulses: Normal pulses.      Heart sounds: Normal heart sounds.   Pulmonary:      Effort: Pulmonary effort is normal.      Breath sounds: Normal breath sounds.   Abdominal:      General: There is no distension.      Palpations: Abdomen is soft. There is no mass.      Tenderness: There is no abdominal tenderness. There is no right CVA tenderness or left CVA tenderness.   Musculoskeletal:         General: No tenderness. Normal range of motion.      Cervical back: No tenderness.      Right lower leg: No edema.      Left lower leg: No edema.   Lymphadenopathy:      Cervical: No cervical adenopathy.   Skin:     General: Skin is warm and dry.      Comments: Smooth moveable mass mid back c/w lipoma   Neurological:      General: No focal deficit present.      Mental Status: She is alert and oriented to person, place, and time. Mental status is  at baseline.   Psychiatric:         Mood and Affect: Mood normal.         Behavior: Behavior normal.         Assessment:       1. Wellness examination    2. Essential hypertension    3. Lipoma of torso    4. Acquired hypothyroidism        Plan:       Aidee was seen today for annual exam.    Diagnoses and all orders for this visit:    Wellness examination  -     CBC Auto Differential; Future  -     Comprehensive Metabolic Panel; Future  -     Hemoglobin A1C; Future  -     Lipid Panel; Future  -     TSH; Future    Essential hypertension  -     amLODIPine (NORVASC) 5 MG tablet; Take 1 tablet (5 mg total) by mouth once daily.    Lipoma of torso    Acquired hypothyroidism  -     T4, Free; Future      During this visit, I reviewed the pt's history, medications, allergies, and problem list.

## 2024-08-27 ENCOUNTER — CLINICAL SUPPORT (OUTPATIENT)
Dept: REHABILITATION | Facility: HOSPITAL | Age: 59
End: 2024-08-27
Attending: STUDENT IN AN ORGANIZED HEALTH CARE EDUCATION/TRAINING PROGRAM
Payer: COMMERCIAL

## 2024-08-27 DIAGNOSIS — R29.898 ANKLE WEAKNESS: ICD-10-CM

## 2024-08-27 DIAGNOSIS — R26.89 FUNCTIONAL GAIT ABNORMALITY: Primary | ICD-10-CM

## 2024-08-27 PROCEDURE — 97530 THERAPEUTIC ACTIVITIES: CPT | Mod: PO,CQ

## 2024-08-27 PROCEDURE — 97110 THERAPEUTIC EXERCISES: CPT | Mod: PO,CQ

## 2024-08-27 PROCEDURE — 97112 NEUROMUSCULAR REEDUCATION: CPT | Mod: PO,CQ

## 2024-08-27 NOTE — PROGRESS NOTES
[facility]   Physical Therapy Treatment Note     Name: Aidee Hernandez [facility]  Clinic Number: [id number]    Therapy Diagnosis:   Encounter Diagnoses   Name Primary?    Functional gait abnormality Yes    Ankle weakness        Physician: Luis Osullivan DPM    Visit Date: 8/27/2024  Physician Orders: PT Eval and Treat    Medical Diagnosis from Referral:   Injury of peroneal tendon of right foot, initial encounter   Peroneal tendinosis, right  Evaluation Date: 8/6/2024  Authorization Period Expiration: 12/31/2024  Plan of Care Expiration: 10/04/2024  Progress Note Due: 09/06/2024  Visit # / Visits authorized: 3/7  FOTO: 1/3    PTA Visit #: 3/5     Precautions: Standard     Time In: 0805 AM  Time Out: 0859 AM  Total Billable Time: 54 minutes    SUBJECTIVE     Pt reports: she followed up with Podiatrist last week and he is pleased with the progress she has made in therapy. Patient states she is not going to have surgery and she will follow up with him again in 1 month. Patient states she is noticing improvements everyday but she does have discomfort when walking fast. She was compliant with home exercise program.  Response to previous treatment: no adverse effects   Functional change: too soon to determine     Pain: 2/10  Location: right ankle    OBJECTIVE     Objective Measures updated at progress report unless specified.     Treatment     Aidee received the treatments listed below:      therapeutic exercises to develop strength, endurance, ROM, flexibility, posture, and core stabilization for 10 minutes including:  Recumbent bike x 5 minutes   Standing gastroc stretch on incline 1 minute x 3   Long sitting PF with blue TB 3x10 (slow return to DF)     manual therapy techniques: Myofacial release, Soft tissue Mobilization, and Friction Massage were applied to the: (R) ankle for 00 minutes, including:  NP Today    neuromuscular re-education activities to improve: Balance, Coordination,  Proprioception, and Posture for 35 minutes. The following activities were included:  Isometric Eversion: PTA providing resistance 3x10  Eversion against red TB 2x10  Seated towel crunches x 2 minutes, 3 sec hold   Seated arch doming x 2 minutes, 3 sec hold   S/L hip abduction 2x10    Standing heel raise 2x10  Narrow base on air ex pad, eyes closed x 30 sec x 2 (no shoes)  Tandem stance 30 sec x 2, B (no shoes)  SL stance 15 sec x 2 (no shoes)    Therapeutic activities to improve functional strength and endurance for 10 minutes:  BLE shuttle squats 3x10, 62.5#   SL shuttle squats 2x10, 37.5#  Step ups (6 inch, forward) 2x10    Patient Education and Home Exercises     Home Exercises Provided and Patient Education Provided     Education provided:   - HEP compliance    Written Home Exercises Provided: Patient instructed to cont prior HEP. Exercises were reviewed and Aidee was able to demonstrate them prior to the end of the session.  Aidee demonstrated good  understanding of the education provided. See EMR under Patient Instructions for exercises provided during therapy sessions    ASSESSMENT     Aidee returns after positive follow up appointment with Podiatrist with plans to delay surgery at this time. Progressed to functional strengthening activities without adverse effects. Performed squats on shuttle to focus on form without effect of gravity. Patient requiring cues for activation of lateral glutes and correction of arch collapse with step ups activities. Excellent tolerance to consistent exercise progression.      Aidee Is progressing well towards her goals.   Pt prognosis is Good.     Pt will continue to benefit from skilled outpatient physical therapy to address the deficits listed in the problem list box on initial evaluation, provide pt/family education and to maximize pt's level of independence in the home and community environment.     Pt's spiritual, cultural and educational needs considered and pt agreeable  "to plan of care and goals.     Anticipated barriers to physical therapy: none    Goals:   Short Term Goals: 4 weeks   Independent with HEP with < 3/10 pain.  Improve right ankle DF 3-5 degrees for ambulatory tasks,  Improve right hip/ankle MMT 1/2 grade for home management tasks.  Demonstrate SLS > 3" of RLE for balance purposes.  Demonstrated standing: PF/inversion independently with no pain.     Long Term Goals: 8 weeks   Independent with HEP with no painful limitations.  Demonstrate right ankle dorsiflexion> 10 degrees actively for ambulatory purposes.  Demonstrate right hip/ankle MMT > 4+/5 for standing tolerance tasks > 10'  Return to community ambulation independently with 50% reduction in right ankle/foot episodes.     PLAN     Continue current POC with emphasis on LE function and stability.     Kellie Broderick, PTA       "

## 2024-09-05 ENCOUNTER — CLINICAL SUPPORT (OUTPATIENT)
Dept: REHABILITATION | Facility: HOSPITAL | Age: 59
End: 2024-09-05
Payer: COMMERCIAL

## 2024-09-05 DIAGNOSIS — R29.898 ANKLE WEAKNESS: ICD-10-CM

## 2024-09-05 DIAGNOSIS — R26.89 FUNCTIONAL GAIT ABNORMALITY: Primary | ICD-10-CM

## 2024-09-05 PROCEDURE — 97110 THERAPEUTIC EXERCISES: CPT | Mod: PO,CQ

## 2024-09-05 PROCEDURE — 97112 NEUROMUSCULAR REEDUCATION: CPT | Mod: PO,CQ

## 2024-09-05 PROCEDURE — 97530 THERAPEUTIC ACTIVITIES: CPT | Mod: PO,CQ

## 2024-09-05 NOTE — PROGRESS NOTES
[facility]   Physical Therapy Treatment Note     Name: Aidee Hernandez [facility]  Clinic Number: [id number]    Therapy Diagnosis:   Encounter Diagnoses   Name Primary?    Functional gait abnormality Yes    Ankle weakness        Physician: Luis Osullivan DPM    Visit Date: 9/5/2024  Physician Orders: PT Eval and Treat    Medical Diagnosis from Referral:   Injury of peroneal tendon of right foot, initial encounter   Peroneal tendinosis, right  Evaluation Date: 8/6/2024  Authorization Period Expiration: 12/31/2024  Plan of Care Expiration: 10/04/2024  Progress Note Due: 09/06/2024  Visit # / Visits authorized: 4/7  FOTO: 1/3    PTA Visit #: 3/5     Precautions: Standard     Time In: 0817 AM (patient late due to traffic)  Time Out: 0859 AM  Total Billable Time: 40 minutes    SUBJECTIVE     Pt reports: her ankle is doing well and she recently got a new pair of Hokas. She was compliant with home exercise program.  Response to previous treatment: no adverse effects   Functional change: too soon to determine     Pain: 2/10  Location: right ankle    OBJECTIVE     Objective Measures updated at progress report unless specified.     Treatment     Aidee received the treatments listed below:      therapeutic exercises to develop strength, endurance, ROM, flexibility, posture, and core stabilization for 15 minutes including:  Recumbent bike x 5 minutes   Standing gastroc stretch on incline 1 minute x 3   Long sitting PF with blue TB 3x10 (slow return to DF)   S/L hip abduction 2x10    manual therapy techniques: Myofacial release, Soft tissue Mobilization, and Friction Massage were applied to the: (R) ankle for 00 minutes, including:  NP Today    neuromuscular re-education activities to improve: Balance, Coordination, Proprioception, and Posture for 30 minutes. The following activities were included:  Isometric Eversion: PTA providing resistance 3x10  Eversion against red TB 2x10 (Cues for controlled  return)  Seated towel crunches x 2 minutes, 3 sec hold   Seated arch doming x 2 minutes, 3 sec hold     Standing heel raise 2x10  Narrow base on air ex pad, eyes closed x 30 sec x 2 (no shoes)  Tandem stance 30 sec x 2, B (no shoes)  SL stance 15 sec x 2 (no shoes)    Therapeutic activities to improve functional strength and endurance for 10 minutes:  BLE shuttle squats 3x10, 62.5#   SL shuttle squats 2x10, 37.5#  Step ups (6 inch, forward and lateral) 2x10    Patient Education and Home Exercises     Home Exercises Provided and Patient Education Provided     Education provided:   - HEP compliance    Written Home Exercises Provided: Patient instructed to cont prior HEP. Exercises were reviewed and Aidee was able to demonstrate them prior to the end of the session.  Aidee demonstrated good  understanding of the education provided. See EMR under Patient Instructions for exercises provided during therapy sessions    ASSESSMENT     Aidee presents to clinic with new Hokas and significant reduction in pain since starting skilled care. Patient demonstrating proper utilization of ankle strategies when in single limb stance and she is able to maintain balance for 15 seconds. Excellent tolerance to functional strengthening and progression to lateral step ups today.     Aidee Is progressing well towards her goals.   Pt prognosis is Good.     Pt will continue to benefit from skilled outpatient physical therapy to address the deficits listed in the problem list box on initial evaluation, provide pt/family education and to maximize pt's level of independence in the home and community environment.     Pt's spiritual, cultural and educational needs considered and pt agreeable to plan of care and goals.     Anticipated barriers to physical therapy: none    Goals:   Short Term Goals: 4 weeks   Independent with HEP with < 3/10 pain.  Improve right ankle DF 3-5 degrees for ambulatory tasks,  Improve right hip/ankle MMT 1/2 grade for  "home management tasks.  Demonstrate SLS > 3" of RLE for balance purposes.  Demonstrated standing: PF/inversion independently with no pain.     Long Term Goals: 8 weeks   Independent with HEP with no painful limitations.  Demonstrate right ankle dorsiflexion> 10 degrees actively for ambulatory purposes.  Demonstrate right hip/ankle MMT > 4+/5 for standing tolerance tasks > 10'  Return to community ambulation independently with 50% reduction in right ankle/foot episodes.     PLAN     Continue current POC with emphasis on LE function and stability.     Kellie Broderick, PTA     "

## 2024-09-06 ENCOUNTER — HOSPITAL ENCOUNTER (OUTPATIENT)
Dept: RADIOLOGY | Facility: HOSPITAL | Age: 59
Discharge: HOME | End: 2024-09-06
Attending: FAMILY MEDICINE
Payer: COMMERCIAL

## 2024-09-06 DIAGNOSIS — Z12.31 ENCOUNTER FOR SCREENING MAMMOGRAM FOR BREAST CANCER: ICD-10-CM

## 2024-09-06 PROCEDURE — 77063 BREAST TOMOSYNTHESIS BI: CPT | Mod: 26,,, | Performed by: RADIOLOGY

## 2024-09-06 PROCEDURE — 77067 SCR MAMMO BI INCL CAD: CPT | Mod: 26,,, | Performed by: RADIOLOGY

## 2024-09-06 PROCEDURE — 77063 BREAST TOMOSYNTHESIS BI: CPT | Mod: TC,PO

## 2024-09-08 DIAGNOSIS — E03.9 ACQUIRED HYPOTHYROIDISM: ICD-10-CM

## 2024-09-08 RX ORDER — LEVOTHYROXINE SODIUM 50 UG/1
50 TABLET ORAL
Qty: 90 TABLET | Refills: 3 | Status: SHIPPED | OUTPATIENT
Start: 2024-09-08

## 2024-09-08 NOTE — TELEPHONE ENCOUNTER
Refill Decision Note   Aidee Lachelle  is requesting a refill authorization.  Brief Assessment and Rationale for Refill:  Approve     Medication Therapy Plan:        Comments:     Note composed:4:59 PM 09/08/2024

## 2024-09-08 NOTE — TELEPHONE ENCOUNTER
No care due was identified.  [facility] Embedded Care Due Messages. Reference number: [id number].   9/08/2024 1:07:00 AM CDT

## 2024-09-14 DIAGNOSIS — B00.1 FEVER BLISTER: ICD-10-CM

## 2024-09-15 RX ORDER — VALACYCLOVIR HYDROCHLORIDE 1 G/1
2000 TABLET, FILM COATED ORAL 2 TIMES DAILY PRN
Qty: 30 TABLET | Refills: 11 | Status: SHIPPED | OUTPATIENT
Start: 2024-09-15 | End: 2025-03-14

## 2024-09-17 ENCOUNTER — TELEPHONE (OUTPATIENT)
Dept: PODIATRY | Facility: CLINIC | Age: 59
End: 2024-09-17
Payer: COMMERCIAL

## 2024-09-17 NOTE — TELEPHONE ENCOUNTER
Called patient to reschedule appointment per provider request. Phone went to voicemail and a message was left for a call back.

## 2024-09-18 ENCOUNTER — TELEPHONE (OUTPATIENT)
Dept: PODIATRY | Facility: CLINIC | Age: 59
End: 2024-09-18
Payer: COMMERCIAL

## 2024-09-18 NOTE — TELEPHONE ENCOUNTER
----- Message from Magy Calabrese sent at 9/18/2024 10:42 AM CDT -----  Contact: Patient  Type:  Sooner Apoointment Request    Caller is requesting a sooner appointment.  Caller declined first available appointment listed below.  Caller will not accept being placed on the waitlist and is requesting a message be sent to doctor.  Name of Caller:Patient     When is the first available appointment?October 22nd     Symptoms:30 day FU    Would the patient rather a call back or a response via [facility]? Call    Best Call Back Number:[phone] (home)     Additional Information: Please call to advise

## 2024-10-28 ENCOUNTER — OFFICE VISIT (OUTPATIENT)
Dept: PODIATRY | Facility: CLINIC | Age: 59
End: 2024-10-28
Payer: COMMERCIAL

## 2024-10-28 VITALS — BODY MASS INDEX: 36.93 KG/M2 | WEIGHT: 183.19 LBS | HEIGHT: 59 IN

## 2024-10-28 DIAGNOSIS — S86.301A INJURY OF PERONEAL TENDON OF RIGHT FOOT, INITIAL ENCOUNTER: Primary | ICD-10-CM

## 2024-10-28 DIAGNOSIS — M67.88 PERONEAL TENDINOSIS, RIGHT: ICD-10-CM

## 2024-10-28 PROCEDURE — 1159F MED LIST DOCD IN RCRD: CPT | Mod: CPTII,S$GLB,, | Performed by: STUDENT IN AN ORGANIZED HEALTH CARE EDUCATION/TRAINING PROGRAM

## 2024-10-28 PROCEDURE — 1160F RVW MEDS BY RX/DR IN RCRD: CPT | Mod: CPTII,S$GLB,, | Performed by: STUDENT IN AN ORGANIZED HEALTH CARE EDUCATION/TRAINING PROGRAM

## 2024-10-28 PROCEDURE — 99999 PR PBB SHADOW E&M-EST. PATIENT-LVL III: CPT | Mod: PBBFAC,,, | Performed by: STUDENT IN AN ORGANIZED HEALTH CARE EDUCATION/TRAINING PROGRAM

## 2024-10-28 PROCEDURE — 3044F HG A1C LEVEL LT 7.0%: CPT | Mod: CPTII,S$GLB,, | Performed by: STUDENT IN AN ORGANIZED HEALTH CARE EDUCATION/TRAINING PROGRAM

## 2024-10-28 PROCEDURE — 99213 OFFICE O/P EST LOW 20 MIN: CPT | Mod: S$GLB,,, | Performed by: STUDENT IN AN ORGANIZED HEALTH CARE EDUCATION/TRAINING PROGRAM

## 2024-10-28 PROCEDURE — 3008F BODY MASS INDEX DOCD: CPT | Mod: CPTII,S$GLB,, | Performed by: STUDENT IN AN ORGANIZED HEALTH CARE EDUCATION/TRAINING PROGRAM

## 2024-10-29 ENCOUNTER — PATIENT MESSAGE (OUTPATIENT)
Dept: PODIATRY | Facility: CLINIC | Age: 59
End: 2024-10-29
Payer: COMMERCIAL

## 2024-11-11 ENCOUNTER — OFFICE VISIT (OUTPATIENT)
Dept: PODIATRY | Facility: CLINIC | Age: 59
End: 2024-11-11
Payer: COMMERCIAL

## 2024-11-11 ENCOUNTER — PATIENT MESSAGE (OUTPATIENT)
Dept: FAMILY MEDICINE | Facility: CLINIC | Age: 59
End: 2024-11-11
Payer: COMMERCIAL

## 2024-11-11 VITALS — WEIGHT: 183.19 LBS | HEIGHT: 59 IN | BODY MASS INDEX: 36.93 KG/M2

## 2024-11-11 DIAGNOSIS — S86.311D PERONEAL TENDON TEAR, RIGHT, SUBSEQUENT ENCOUNTER: Primary | ICD-10-CM

## 2024-11-11 PROCEDURE — 1159F MED LIST DOCD IN RCRD: CPT | Mod: CPTII,S$GLB,, | Performed by: PODIATRIST

## 2024-11-11 PROCEDURE — 99999 PR PBB SHADOW E&M-EST. PATIENT-LVL III: CPT | Mod: PBBFAC,,, | Performed by: PODIATRIST

## 2024-11-11 PROCEDURE — 3044F HG A1C LEVEL LT 7.0%: CPT | Mod: CPTII,S$GLB,, | Performed by: PODIATRIST

## 2024-11-11 PROCEDURE — 3008F BODY MASS INDEX DOCD: CPT | Mod: CPTII,S$GLB,, | Performed by: PODIATRIST

## 2024-11-11 PROCEDURE — 99213 OFFICE O/P EST LOW 20 MIN: CPT | Mod: S$GLB,,, | Performed by: PODIATRIST

## 2024-11-11 PROCEDURE — 1160F RVW MEDS BY RX/DR IN RCRD: CPT | Mod: CPTII,S$GLB,, | Performed by: PODIATRIST

## 2024-11-11 RX ORDER — PHENTERMINE HYDROCHLORIDE 37.5 MG/1
18.75 TABLET ORAL 2 TIMES DAILY
COMMUNITY
Start: 2024-06-11

## 2024-11-11 RX ORDER — MELOXICAM 15 MG/1
15 TABLET ORAL
COMMUNITY
Start: 2024-06-08

## 2024-11-11 NOTE — PROGRESS NOTES
Subjective:      Patient ID: Aidee Larose is a 59 y.o. female.    Chief Complaint: Foot Pain    Aidee is a 59 y.o. female who presents to the podiatry clinic  with complaint of  right foot pain lateral ankle. Onset of the symptoms was  last summer over a year ago . Precipitating event: increased activity and overuse injury on her feet a lot . Current symptoms include: ability to bear weight, but with some pain, swelling, and worsening symptoms after a period of activity. Aggravating factors: any weight bearing. Symptoms have gradually worsened. Patient has had prior foot problems. Evaluation to date: MRI: abnormal peroneal tendon tear . Treatment to date:  tried PT twice, offloading in a boot and bracing . Patients rates pain 6/10 on pain scale.    Review of Systems   Constitutional: Negative for chills and fever.   Cardiovascular:  Negative for claudication and leg swelling.   Respiratory:  Negative for shortness of breath.    Skin:  Negative for itching, nail changes and rash.   Musculoskeletal:  Negative for muscle cramps, muscle weakness and myalgias.        Right ankle pain   Gastrointestinal:  Negative for nausea and vomiting.   Neurological:  Negative for focal weakness, loss of balance, numbness and paresthesias.           Objective:      Physical Exam  Constitutional:       General: She is not in acute distress.     Appearance: She is well-developed. She is not diaphoretic.   Cardiovascular:      Pulses:           Dorsalis pedis pulses are 2+ on the right side and 2+ on the left side.        Posterior tibial pulses are 2+ on the right side and 2+ on the left side.      Comments: < 3 sec capillary refill time to toes 1-5 bilateral. Toes and feet are warm to touch proximally with normal distal cooling b/l. There is some hair growth on the feet and toes b/l. There is no edema b/l. No spider veins or varicosities present b/l.     Musculoskeletal:      Comments: Equinus noted b/l ankles with < 10  deg DF noted. MMT 5/5 in DF/PF/Inv/Ev resistance with no reproduction of pain in any direction. Passive range of motion of ankle and pedal joints is painless b/l.    Pain with palpation along the peronea tendons from the posterior lateral mal to distal along the tendon path, pain with eversion against resistance with edema to the area   Skin:     General: Skin is warm and dry.      Coloration: Skin is not pale.      Findings: No abrasion, bruising, burn, ecchymosis, erythema, laceration, lesion, petechiae or rash.      Nails: There is no clubbing.      Comments: Skin temperature, texture and turgor within normal limits.   Neurological:      Mental Status: She is alert and oriented to person, place, and time.      Sensory: No sensory deficit.      Motor: No tremor, atrophy or abnormal muscle tone.      Comments: Negative tinel sign bilateral.   Psychiatric:         Behavior: Behavior normal.       Right ankle MRI  Impression:     1. Peroneus brevis tendinopathy and split tendon tear.  Peroneus longus tendinopathy.  Prominent tenosynovitis of the peroneus longus and brevis tendons.  2. Trace tenosynovitis tibialis posterior and flexor hallucis longus tendons.  3. Small volume joint fluid tibiotalar and subtalar joints.  4. Subcutaneous edema most prominent over the lateral aspect of the foot.        Assessment:       Encounter Diagnosis   Name Primary?    Peroneal tendon tear, right, subsequent encounter Yes         Plan:       Aidee was seen today for foot pain.    Diagnoses and all orders for this visit:    Peroneal tendon tear, right, subsequent encounter  -     Case Request Operating Room: REPAIR, TENDON, LOWER EXTREMITY      I counseled the patient on her conditions, their implications and medical management.    Discussed conservative care in depth including longer offloading in a boot, PT and antiinflammatories, she feels she has exhausted these options and is ready to proceed with surgery    Discussed surgical  repair of the peroneal tendon, 2-4 weeks non weight bearing and 4 weeks in a boot followed by PT she is amendable to this plan    Referred to PCP for medical optimization and risk stratification    Plan for procedure on 11/21/24    Upon reviewing the risks/benefits,  the planned procedure, the surgical goal, and the postoperative course for the Right peroneal tendon repair, the written consent was signed, timed, and dated by the patient with a witness present.      Return 1 week post op

## 2024-11-18 ENCOUNTER — OFFICE VISIT (OUTPATIENT)
Dept: FAMILY MEDICINE | Facility: CLINIC | Age: 59
End: 2024-11-18
Payer: COMMERCIAL

## 2024-11-18 VITALS
WEIGHT: 184.06 LBS | DIASTOLIC BLOOD PRESSURE: 74 MMHG | SYSTOLIC BLOOD PRESSURE: 128 MMHG | HEIGHT: 59 IN | HEART RATE: 75 BPM | BODY MASS INDEX: 37.11 KG/M2 | OXYGEN SATURATION: 97 %

## 2024-11-18 DIAGNOSIS — Z01.818 PREOPERATIVE CLEARANCE: Primary | ICD-10-CM

## 2024-11-18 PROCEDURE — 1160F RVW MEDS BY RX/DR IN RCRD: CPT | Mod: CPTII,S$GLB,, | Performed by: PHYSICIAN ASSISTANT

## 2024-11-18 PROCEDURE — 99214 OFFICE O/P EST MOD 30 MIN: CPT | Mod: S$GLB,,, | Performed by: PHYSICIAN ASSISTANT

## 2024-11-18 PROCEDURE — 3078F DIAST BP <80 MM HG: CPT | Mod: CPTII,S$GLB,, | Performed by: PHYSICIAN ASSISTANT

## 2024-11-18 PROCEDURE — 99999 PR PBB SHADOW E&M-EST. PATIENT-LVL III: CPT | Mod: PBBFAC,,, | Performed by: PHYSICIAN ASSISTANT

## 2024-11-18 PROCEDURE — 3008F BODY MASS INDEX DOCD: CPT | Mod: CPTII,S$GLB,, | Performed by: PHYSICIAN ASSISTANT

## 2024-11-18 PROCEDURE — 3074F SYST BP LT 130 MM HG: CPT | Mod: CPTII,S$GLB,, | Performed by: PHYSICIAN ASSISTANT

## 2024-11-18 PROCEDURE — 1159F MED LIST DOCD IN RCRD: CPT | Mod: CPTII,S$GLB,, | Performed by: PHYSICIAN ASSISTANT

## 2024-11-18 PROCEDURE — 3044F HG A1C LEVEL LT 7.0%: CPT | Mod: CPTII,S$GLB,, | Performed by: PHYSICIAN ASSISTANT

## 2024-11-18 NOTE — PROGRESS NOTES
Patient ID: Aidee Larose is a 59 y.o. female.    Chief Complaint: Pre-op Exam (Ankle Sx on 11/21/24)    Patient is new to me.    Aidee Larose is in the office for preoperative clearance for lower extremity right peroneal tendon repair with Dr. Galaviz 11/21/2024.  Regional anesthesia.  Has had PE in the past after foot fracture in 2005 while using estrogen patches.    HPI  Patient reports right ankle pain for over a year.    Patient Active Problem List   Diagnosis    Symptomatic states associated with artificial menopause    Well female exam with routine gynecological exam    Left navicular fracture of foot    Hypothyroidism    Essential hypertension    Chest pain    Colon cancer screening    Adjustment disorder    Functional gait abnormality    Ankle weakness      Past Medical History:   Diagnosis Date    Hypertension     Hypothyroidism     Left navicular fracture of foot 2005    Miscarriage     x2 first trimester    PE (pulmonary embolism)     after a foot fracture in 2005    PONV (postoperative nausea and vomiting)     Thyroid disease     hypothyroidism     Current Outpatient Medications:     amLODIPine (NORVASC) 5 MG tablet, Take 1 tablet (5 mg total) by mouth once daily., Disp: 90 tablet, Rfl: 3    levothyroxine (SYNTHROID) 50 MCG tablet, TAKE 1 TABLET BY MOUTH EVERY DAY, Disp: 90 tablet, Rfl: 3    multivitamin (THERAGRAN) per tablet, Take 1 tablet by mouth once daily., Disp: , Rfl:     phentermine (ADIPEX-P) 37.5 mg tablet, Take 18.75 mg by mouth 2 (two) times daily., Disp: , Rfl:     valACYclovir (VALTREX) 1000 MG tablet, Take 2 tablets (2,000 mg total) by mouth 2 (two) times daily as needed (fever blister)., Disp: 30 tablet, Rfl: 11    The 10-year ASCVD risk score (Demetrius VILLAFANA, et al., 2019) is: 3.1%    Values used to calculate the score:      Age: 59 years      Sex: Female      Is Non- : No      Diabetic: No      Tobacco smoker: No      Systolic Blood  Pressure: 128 mmHg      Is BP treated: Yes      HDL Cholesterol: 58 mg/dL      Total Cholesterol: 201 mg/dL     Wt Readings from Last 3 Encounters:   11/18/24 83.5 kg (184 lb 1.4 oz)   11/11/24 83.1 kg (183 lb 3.2 oz)   10/28/24 83.1 kg (183 lb 3.2 oz)     Temp Readings from Last 3 Encounters:   09/07/22 98.6 °F (37 °C)   06/11/21 98.4 °F (36.9 °C)   05/29/20 98.5 °F (36.9 °C) (Oral)     BP Readings from Last 3 Encounters:   11/18/24 128/74   08/26/24 132/86   08/30/23 110/78     Pulse Readings from Last 3 Encounters:   11/18/24 75   08/26/24 87   08/30/23 88     Resp Readings from Last 3 Encounters:   01/15/19 18   07/16/18 18   04/09/18 16     PF Readings from Last 3 Encounters:   No data found for PF     SpO2 Readings from Last 3 Encounters:   11/18/24 97%   08/26/24 98%   08/30/23 98%        Lab Results   Component Value Date    HGBA1C 5.8 (H) 09/06/2024    HGBA1C 5.8 (H) 08/30/2023    HGBA1C 5.8 (H) 06/10/2022     Lab Results   Component Value Date    LDLCALC 127.2 09/06/2024    CREATININE 0.7 09/06/2024       Review of Systems   Constitutional:  Negative for appetite change, chills and fever.   HENT:  Negative for ear pain and sore throat.    Eyes:  Negative for pain.   Respiratory:  Negative for cough and shortness of breath.    Cardiovascular:  Negative for chest pain.   Gastrointestinal:  Negative for abdominal pain, blood in stool, constipation, diarrhea, nausea and vomiting.   Genitourinary:  Negative for dysuria, frequency and hematuria.   Musculoskeletal:  Positive for arthralgias.   Skin:  Negative for rash.   Neurological:  Negative for dizziness, light-headedness and headaches.   Psychiatric/Behavioral:  Negative for sleep disturbance.        Objective:      Physical Exam    Vitals reviewed.   Constitutional:       General: She is not in acute distress.     Appearance: Normal appearance. She is well-developed and well-groomed.   HENT:      Head: Normocephalic and atraumatic.      Right Ear: Hearing,  tympanic membrane, ear canal and external ear normal.      Left Ear: Hearing, tympanic membrane, ear canal and external ear normal.      Nose: Nose normal.      Right Sinus: No maxillary sinus tenderness or frontal sinus tenderness.      Left Sinus: No maxillary sinus tenderness or frontal sinus tenderness.      Mouth/Throat:      Lips: Pink.      Mouth: Mucous membranes are moist.      Pharynx: Oropharynx is clear.   Eyes:      General: Lids are normal.      Conjunctiva/sclera: Conjunctivae normal.   Neck:      Trachea: Phonation normal.   Cardiovascular:      Rate and Rhythm: Normal rate and regular rhythm.      Heart sounds: Normal heart sounds. No murmur heard.   No friction rub. No gallop.    Pulmonary:      Effort: Pulmonary effort is normal. No respiratory distress.      Breath sounds: Normal breath sounds. No decreased breath sounds, wheezing, rhonchi or rales.   Abdominal:      Palpations: Abdomen is soft.      Tenderness: There is no abdominal tenderness.   Musculoskeletal:         General: Normal range of motion.      Cervical back: Normal range of motion.   Lymphadenopathy:      Head:      Right side of head: No submental, submandibular, tonsillar, preauricular, posterior auricular or occipital adenopathy.      Left side of head: No submental, submandibular, tonsillar, preauricular, posterior auricular or occipital adenopathy.      Cervical: No cervical adenopathy.   Skin:     General: Skin is warm and dry.      Findings: No rash.   Neurological:      General: No focal deficit present.      Mental Status: She is alert and oriented to person, place, and time.   Psychiatric:         Attention and Perception: Attention normal.         Mood and Affect: Mood normal.         Speech: Speech normal.         Behavior: Behavior normal. Behavior is cooperative.         Cognition and Memory: Cognition normal.         Judgment: Judgment normal.      Screening recommendations appropriate to age and health status were  reviewed.    Preoperative clearance      All current medications were reviewed and at this time no changes to medications are recommended prior to surgery. Patient has stopped the phentermine since last week.    I recommend use of standard pre-op and post-op precautions for this patient. In my opinion, she is medically optimized for this procedure and can proceed without further evaluation.

## 2024-11-20 ENCOUNTER — ANESTHESIA EVENT (OUTPATIENT)
Dept: SURGERY | Facility: HOSPITAL | Age: 59
End: 2024-11-20
Payer: COMMERCIAL

## 2024-11-21 ENCOUNTER — ANESTHESIA (OUTPATIENT)
Dept: SURGERY | Facility: HOSPITAL | Age: 59
End: 2024-11-21
Payer: COMMERCIAL

## 2024-11-21 ENCOUNTER — HOSPITAL ENCOUNTER (OUTPATIENT)
Facility: HOSPITAL | Age: 59
Discharge: HOME | End: 2024-11-21
Attending: PODIATRIST | Admitting: PODIATRIST
Payer: COMMERCIAL

## 2024-11-21 VITALS
WEIGHT: 184 LBS | BODY MASS INDEX: 37.09 KG/M2 | RESPIRATION RATE: 16 BRPM | OXYGEN SATURATION: 98 % | TEMPERATURE: 98 F | DIASTOLIC BLOOD PRESSURE: 65 MMHG | SYSTOLIC BLOOD PRESSURE: 132 MMHG | HEIGHT: 59 IN | HEART RATE: 70 BPM

## 2024-11-21 DIAGNOSIS — S86.311D PERONEAL TENDON TEAR, RIGHT, SUBSEQUENT ENCOUNTER: Primary | ICD-10-CM

## 2024-11-21 PROCEDURE — 64445 NJX AA&/STRD SCIATIC NRV IMG: CPT | Mod: PO | Performed by: ANESTHESIOLOGY

## 2024-11-21 PROCEDURE — 63600175 PHARM REV CODE 636 W HCPCS: Mod: PO | Performed by: PODIATRIST

## 2024-11-21 PROCEDURE — 63600175 PHARM REV CODE 636 W HCPCS: Mod: PO | Performed by: NURSE ANESTHETIST, CERTIFIED REGISTERED

## 2024-11-21 PROCEDURE — 27200750 HC INSULATED NEEDLE/ STIMUPLEX: Mod: PO | Performed by: ANESTHESIOLOGY

## 2024-11-21 PROCEDURE — 37000009 HC ANESTHESIA EA ADD 15 MINS: Mod: PO | Performed by: PODIATRIST

## 2024-11-21 PROCEDURE — 63600175 PHARM REV CODE 636 W HCPCS: Mod: PO | Performed by: ANESTHESIOLOGY

## 2024-11-21 PROCEDURE — C9290 INJ, BUPIVACAINE LIPOSOME: HCPCS | Mod: PO | Performed by: ANESTHESIOLOGY

## 2024-11-21 PROCEDURE — 27200651 HC AIRWAY, LMA: Mod: PO | Performed by: ANESTHESIOLOGY

## 2024-11-21 PROCEDURE — 36000709 HC OR TIME LEV III EA ADD 15 MIN: Mod: PO | Performed by: PODIATRIST

## 2024-11-21 PROCEDURE — 71000015 HC POSTOP RECOV 1ST HR: Mod: PO | Performed by: PODIATRIST

## 2024-11-21 PROCEDURE — 37000008 HC ANESTHESIA 1ST 15 MINUTES: Mod: PO | Performed by: PODIATRIST

## 2024-11-21 PROCEDURE — 71000039 HC RECOVERY, EACH ADD'L HOUR: Mod: PO | Performed by: PODIATRIST

## 2024-11-21 PROCEDURE — 27658 REPAIR OF LEG TENDON EACH: CPT | Mod: RT,,, | Performed by: PODIATRIST

## 2024-11-21 PROCEDURE — 71000033 HC RECOVERY, INTIAL HOUR: Mod: PO | Performed by: PODIATRIST

## 2024-11-21 PROCEDURE — 36000708 HC OR TIME LEV III 1ST 15 MIN: Mod: PO | Performed by: PODIATRIST

## 2024-11-21 DEVICE — TISSUE ALLOWRAP DS WET 4X4CM: Type: IMPLANTABLE DEVICE | Site: FOOT | Status: FUNCTIONAL

## 2024-11-21 RX ORDER — CEFAZOLIN SODIUM 1 G/3ML
2 INJECTION, POWDER, FOR SOLUTION INTRAMUSCULAR; INTRAVENOUS ONCE
Status: COMPLETED | OUTPATIENT
Start: 2024-11-21 | End: 2024-11-21

## 2024-11-21 RX ORDER — ACETAMINOPHEN 10 MG/ML
INJECTION, SOLUTION INTRAVENOUS
Status: DISCONTINUED | OUTPATIENT
Start: 2024-11-21 | End: 2024-11-21

## 2024-11-21 RX ORDER — SODIUM CHLORIDE, SODIUM LACTATE, POTASSIUM CHLORIDE, CALCIUM CHLORIDE 600; 310; 30; 20 MG/100ML; MG/100ML; MG/100ML; MG/100ML
INJECTION, SOLUTION INTRAVENOUS CONTINUOUS
Status: DISCONTINUED | OUTPATIENT
Start: 2024-11-21 | End: 2024-11-21 | Stop reason: HOSPADM

## 2024-11-21 RX ORDER — BUPIVACAINE 13.3 MG/ML
INJECTION, SUSPENSION, LIPOSOMAL INFILTRATION
Status: DISCONTINUED | OUTPATIENT
Start: 2024-11-21 | End: 2024-11-21

## 2024-11-21 RX ORDER — OXYCODONE AND ACETAMINOPHEN 5; 325 MG/1; MG/1
1 TABLET ORAL EVERY 4 HOURS PRN
Qty: 15 TABLET | Refills: 0 | Status: SHIPPED | OUTPATIENT
Start: 2024-11-21

## 2024-11-21 RX ORDER — MIDAZOLAM HYDROCHLORIDE 1 MG/ML
INJECTION INTRAMUSCULAR; INTRAVENOUS
Status: DISCONTINUED | OUTPATIENT
Start: 2024-11-21 | End: 2024-11-21

## 2024-11-21 RX ORDER — FENTANYL CITRATE 50 UG/ML
25-200 INJECTION, SOLUTION INTRAMUSCULAR; INTRAVENOUS
Status: DISCONTINUED | OUTPATIENT
Start: 2024-11-21 | End: 2024-11-21 | Stop reason: HOSPADM

## 2024-11-21 RX ORDER — BUPIVACAINE HYDROCHLORIDE 5 MG/ML
INJECTION, SOLUTION EPIDURAL; INTRACAUDAL
Status: DISCONTINUED | OUTPATIENT
Start: 2024-11-21 | End: 2024-11-21

## 2024-11-21 RX ORDER — MEPERIDINE HYDROCHLORIDE 50 MG/ML
12.5 INJECTION INTRAMUSCULAR; INTRAVENOUS; SUBCUTANEOUS ONCE AS NEEDED
Status: DISCONTINUED | OUTPATIENT
Start: 2024-11-21 | End: 2024-11-21 | Stop reason: HOSPADM

## 2024-11-21 RX ORDER — LIDOCAINE HYDROCHLORIDE 10 MG/ML
1 INJECTION, SOLUTION EPIDURAL; INFILTRATION; INTRACAUDAL; PERINEURAL ONCE
Status: DISCONTINUED | OUTPATIENT
Start: 2024-11-21 | End: 2024-11-21 | Stop reason: HOSPADM

## 2024-11-21 RX ORDER — MIDAZOLAM HYDROCHLORIDE 1 MG/ML
.5-4 INJECTION, SOLUTION INTRAMUSCULAR; INTRAVENOUS
Status: DISCONTINUED | OUTPATIENT
Start: 2024-11-21 | End: 2024-11-21 | Stop reason: HOSPADM

## 2024-11-21 RX ORDER — DEXAMETHASONE SODIUM PHOSPHATE 4 MG/ML
INJECTION, SOLUTION INTRA-ARTICULAR; INTRALESIONAL; INTRAMUSCULAR; INTRAVENOUS; SOFT TISSUE
Status: DISCONTINUED | OUTPATIENT
Start: 2024-11-21 | End: 2024-11-21

## 2024-11-21 RX ORDER — ONDANSETRON HYDROCHLORIDE 2 MG/ML
4 INJECTION, SOLUTION INTRAVENOUS DAILY PRN
Status: DISCONTINUED | OUTPATIENT
Start: 2024-11-21 | End: 2024-11-21 | Stop reason: HOSPADM

## 2024-11-21 RX ORDER — HYDROMORPHONE HYDROCHLORIDE 2 MG/ML
0.2 INJECTION, SOLUTION INTRAMUSCULAR; INTRAVENOUS; SUBCUTANEOUS EVERY 5 MIN PRN
Status: DISCONTINUED | OUTPATIENT
Start: 2024-11-21 | End: 2024-11-21 | Stop reason: HOSPADM

## 2024-11-21 RX ORDER — LIDOCAINE HYDROCHLORIDE 20 MG/ML
INJECTION INTRAVENOUS
Status: DISCONTINUED | OUTPATIENT
Start: 2024-11-21 | End: 2024-11-21

## 2024-11-21 RX ORDER — ROPIVACAINE HYDROCHLORIDE 5 MG/ML
INJECTION, SOLUTION EPIDURAL; INFILTRATION; PERINEURAL
Status: DISCONTINUED | OUTPATIENT
Start: 2024-11-21 | End: 2024-11-21

## 2024-11-21 RX ORDER — PHENYLEPHRINE HYDROCHLORIDE 10 MG/ML
INJECTION INTRAVENOUS
Status: DISCONTINUED | OUTPATIENT
Start: 2024-11-21 | End: 2024-11-21

## 2024-11-21 RX ORDER — FENTANYL CITRATE 50 UG/ML
25 INJECTION, SOLUTION INTRAMUSCULAR; INTRAVENOUS EVERY 5 MIN PRN
Status: DISCONTINUED | OUTPATIENT
Start: 2024-11-21 | End: 2024-11-21 | Stop reason: HOSPADM

## 2024-11-21 RX ORDER — DIPHENHYDRAMINE HYDROCHLORIDE 50 MG/ML
12.5 INJECTION INTRAMUSCULAR; INTRAVENOUS EVERY 6 HOURS PRN
Status: DISCONTINUED | OUTPATIENT
Start: 2024-11-21 | End: 2024-11-21 | Stop reason: HOSPADM

## 2024-11-21 RX ORDER — FENTANYL CITRATE 50 UG/ML
INJECTION, SOLUTION INTRAMUSCULAR; INTRAVENOUS
Status: DISCONTINUED | OUTPATIENT
Start: 2024-11-21 | End: 2024-11-21

## 2024-11-21 RX ORDER — PROPOFOL 10 MG/ML
VIAL (ML) INTRAVENOUS
Status: DISCONTINUED | OUTPATIENT
Start: 2024-11-21 | End: 2024-11-21

## 2024-11-21 RX ORDER — ONDANSETRON HYDROCHLORIDE 2 MG/ML
INJECTION, SOLUTION INTRAVENOUS
Status: DISCONTINUED | OUTPATIENT
Start: 2024-11-21 | End: 2024-11-21

## 2024-11-21 RX ORDER — OXYCODONE HYDROCHLORIDE 5 MG/1
5 TABLET ORAL
Status: DISCONTINUED | OUTPATIENT
Start: 2024-11-21 | End: 2024-11-21 | Stop reason: HOSPADM

## 2024-11-21 RX ORDER — GLUCAGON 1 MG
1 KIT INJECTION
Status: DISCONTINUED | OUTPATIENT
Start: 2024-11-21 | End: 2024-11-21 | Stop reason: HOSPADM

## 2024-11-21 RX ADMIN — BUPIVACAINE 10 ML: 13.3 INJECTION, SUSPENSION, LIPOSOMAL INFILTRATION at 10:11

## 2024-11-21 RX ADMIN — PHENYLEPHRINE HYDROCHLORIDE 100 MCG: 10 INJECTION INTRAVENOUS at 11:11

## 2024-11-21 RX ADMIN — PROPOFOL 150 MG: 10 INJECTION, EMULSION INTRAVENOUS at 11:11

## 2024-11-21 RX ADMIN — ONDANSETRON 4 MG: 2 INJECTION, SOLUTION INTRAMUSCULAR; INTRAVENOUS at 11:11

## 2024-11-21 RX ADMIN — PHENYLEPHRINE HYDROCHLORIDE 100 MCG: 10 INJECTION INTRAVENOUS at 12:11

## 2024-11-21 RX ADMIN — DEXAMETHASONE SODIUM PHOSPHATE 4 MG: 4 INJECTION, SOLUTION INTRAMUSCULAR; INTRAVENOUS at 11:11

## 2024-11-21 RX ADMIN — CEFAZOLIN 2 G: 330 INJECTION, POWDER, FOR SOLUTION INTRAMUSCULAR; INTRAVENOUS at 11:11

## 2024-11-21 RX ADMIN — LIDOCAINE HYDROCHLORIDE 100 MG: 20 INJECTION INTRAVENOUS at 11:11

## 2024-11-21 RX ADMIN — ROPIVACAINE HYDROCHLORIDE 30 ML: 5 INJECTION, SOLUTION EPIDURAL; INFILTRATION; PERINEURAL at 10:11

## 2024-11-21 RX ADMIN — MIDAZOLAM HYDROCHLORIDE 2 MG: 2 INJECTION, SOLUTION INTRAMUSCULAR; INTRAVENOUS at 11:11

## 2024-11-21 RX ADMIN — SODIUM CHLORIDE, POTASSIUM CHLORIDE, SODIUM LACTATE AND CALCIUM CHLORIDE: 600; 310; 30; 20 INJECTION, SOLUTION INTRAVENOUS at 09:11

## 2024-11-21 RX ADMIN — FENTANYL CITRATE 50 MCG: 50 INJECTION, SOLUTION INTRAMUSCULAR; INTRAVENOUS at 11:11

## 2024-11-21 RX ADMIN — FENTANYL CITRATE 100 MCG: 50 INJECTION INTRAMUSCULAR; INTRAVENOUS at 09:11

## 2024-11-21 RX ADMIN — MIDAZOLAM 2 MG: 1 INJECTION INTRAMUSCULAR; INTRAVENOUS at 09:11

## 2024-11-21 RX ADMIN — ACETAMINOPHEN 1000 MG: 10 INJECTION INTRAVENOUS at 11:11

## 2024-11-21 RX ADMIN — BUPIVACAINE HYDROCHLORIDE 10 ML: 5 INJECTION, SOLUTION EPIDURAL; INTRACAUDAL; PERINEURAL at 10:11

## 2024-11-21 NOTE — ANESTHESIA PROCEDURE NOTES
Peripheral Block    Patient location during procedure: pre-op   Block not for primary anesthetic.  Reason for block: at surgeon's request and post-op pain management   Post-op Pain Location: right foot   Start time: 11/21/2024 10:07 AM  Timeout: 11/21/2024 10:07 AM   End time: 11/21/2024 10:14 AM    Staffing  Authorizing Provider: Shane Bautista MD  Performing Provider: Shane Bautista MD    Staffing  Performed by: Shane Bautista MD  Authorized by: Shane Bautista MD    Preanesthetic Checklist  Completed: patient identified, IV checked, site marked, risks and benefits discussed, surgical consent, monitors and equipment checked, pre-op evaluation and timeout performed  Peripheral Block  Patient position: supine  Prep: ChloraPrep  Patient monitoring: heart rate, cardiac monitor, continuous pulse ox, continuous capnometry and frequent blood pressure checks  Block type: adductor canal  Laterality: right  Injection technique: single shot  Needle  Needle type: Stimuplex   Needle gauge: 21 G  Needle length: 4 in  Needle localization: anatomical landmarks and ultrasound guidance   -ultrasound image captured on disc.  Assessment  Injection assessment: negative aspiration, negative parasthesia, local visualized surrounding nerve and positive parasthesia  Paresthesia pain: immediately resolved  Heart rate change: no  Slow fractionated injection: yes  Pain Tolerance: comfortable throughout block and no complaints      Additional Notes  VSS.  [facility] RN monitoring vitals throughout procedure.  Patient tolerated procedure well.

## 2024-11-21 NOTE — ANESTHESIA PROCEDURE NOTES
LMA insertion    Date/Time: 11/21/2024 11:26 AM    Performed by: Glenny Christie CRNA  Authorized by: Ana Talavera CRNA    Intubation:     Induction:  Intravenous    Intubated:  Postinduction    Mask Ventilation:  Easy mask    Attempts:  1    Attempted By:  CRNA    Difficult Airway Encountered?: No      Complications:  None    Airway Device:  Supraglottic airway/LMA    Airway Device Size:  3.0    Style/Cuff Inflation:  Cuffed (inflated to minimal occlusive pressure)    Inflation Amount (mL):  8    Secured at:  The lips    Placement Verified By:  Capnometry    Complicating Factors:  None    Findings Post-Intubation:  BS equal bilateral and atraumatic/condition of teeth unchanged

## 2024-11-21 NOTE — INTERVAL H&P NOTE
The patient has been examined and the H&P has been reviewed:    I concur with the findings and no changes have occurred since H&P was written.    Anesthesia risks, benefits and alternative options discussed and understood by patient/family.    Agree with H&P.  Shane Bautista MD      There are no hospital problems to display for this patient.

## 2024-11-21 NOTE — ANESTHESIA PREPROCEDURE EVALUATION
11/21/2024  Aidee Larose is a 59 y.o., female.      Pre-op Assessment    I have reviewed the Patient Summary Reports.     I have reviewed the Nursing Notes. I have reviewed the NPO Status.   I have reviewed the Medications.     Review of Systems  Cardiovascular:     Hypertension                                    Hypertension         Pulmonary:  Pulmonary Normal                       Renal/:  Renal/ Normal                 Hepatic/GI:  Hepatic/GI Normal                    Musculoskeletal:     Right foot            Neurological:  Neurology Normal                                      Endocrine:   Hypothyroidism       Hypothyroidism          Psych:  Psychiatric History                  Physical Exam  General: Well nourished    Airway:  Mallampati: II   Neck ROM: Normal ROM    Dental:  Intact    Chest/Lungs:  Clear to auscultation, Normal Respiratory Rate    Heart:  Rate: Normal  Rhythm: Regular Rhythm        Anesthesia Plan  Type of Anesthesia, risks & benefits discussed:    Anesthesia Type: Gen Supraglottic Airway  Intra-op Monitoring Plan: Standard ASA Monitors  Post Op Pain Control Plan: multimodal analgesia, peripheral nerve block and IV/PO Opioids PRN  Induction:  IV and Inhalation  Informed Consent: Informed consent signed with the Patient and all parties understand the risks and agree with anesthesia plan.  All questions answered.   ASA Score: 2  Day of Surgery Review of History & Physical: H&P Update referred to the surgeon/provider.    Ready For Surgery From Anesthesia Perspective.     .

## 2024-11-21 NOTE — TRANSFER OF CARE
"Anesthesia Transfer of Care Note    Patient: Aidee Larose    Procedure(s) Performed: Procedure(s) (LRB):  REPAIR, TENDON, LOWER EXTREMITY (Right)    Patient location: PACU    Anesthesia Type: general    Transport from OR: Transported from OR on room air with adequate spontaneous ventilation    Post pain: adequate analgesia    Post assessment: no apparent anesthetic complications and tolerated procedure well    Post vital signs: stable    Level of consciousness: responds to stimulation    Nausea/Vomiting: no nausea/vomiting    Complications: none    Transfer of care protocol was followed    Last vitals: Visit Vitals  /78 (BP Location: Right arm, Patient Position: Lying)   Pulse 67   Temp 36.6 °C (97.9 °F) (Skin)   Resp 16   Ht 4' 11" (1.499 m)   Wt 83.5 kg (184 lb)   LMP 02/05/2013   SpO2 98%   Breastfeeding No   BMI 37.16 kg/m²     "

## 2024-11-21 NOTE — ANESTHESIA PROCEDURE NOTES
Peripheral Block    Patient location during procedure: pre-op   Block not for primary anesthetic.  Reason for block: at surgeon's request and post-op pain management   Post-op Pain Location: right foot   Start time: 11/21/2024 9:58 AM  Timeout: 11/21/2024 9:58 AM   End time: 11/21/2024 10:05 AM    Staffing  Authorizing Provider: Shane Bautista MD  Performing Provider: Shane Bautista MD    Staffing  Performed by: Shane Bautista MD  Authorized by: Shane Bautista MD    Preanesthetic Checklist  Completed: patient identified, IV checked, site marked, risks and benefits discussed, surgical consent, monitors and equipment checked, pre-op evaluation and timeout performed  Peripheral Block  Patient position: supine  Prep: ChloraPrep  Patient monitoring: heart rate, cardiac monitor, continuous pulse ox, continuous capnometry and frequent blood pressure checks  Block type: popliteal  Laterality: right  Injection technique: single shot  Needle  Needle type: Stimuplex   Needle gauge: 21 G  Needle length: 4 in  Needle localization: anatomical landmarks and ultrasound guidance   -ultrasound image captured on disc.  Assessment  Injection assessment: negative aspiration, negative parasthesia, local visualized surrounding nerve and positive parasthesia  Paresthesia pain: immediately resolved  Heart rate change: no  Slow fractionated injection: yes  Pain Tolerance: comfortable throughout block and no complaints      Additional Notes  VSS.  [facility] RN monitoring vitals throughout procedure.  Patient tolerated procedure well.

## 2024-11-21 NOTE — INTERVAL H&P NOTE
The patient has been examined and the previous podiatry progress note has been reviewed:    I concur with the findings and no changes have occurred since my previous progress note was written.    Surgery risks, benefits and alternative options discussed and understood by patient/family.    Proceed as discussed consent reviewed and proper site confirmed and marked    Chong Galaviz DPM           There are no hospital problems to display for this patient.

## 2024-11-21 NOTE — PLAN OF CARE
Right popliteal single shot block and right saphenous canal single shot block complete per Dr. Bautista with Anesthesia. Exparel band placed to pt's wrist. Pt tolerated well. See Anesthesia record for procedural notes. See flowsheet and MAR for vitals and medications. Monitors in place, alarms audible. VSS. etCO2 monitoring in place. Resp even, unlabored. Skin warm, dry. Pt in NAD. Pt awaiting transport to OR. Family at bedside. Bed in lowest, locked position. Side rails up x2. Call light within reach.

## 2024-11-21 NOTE — OP NOTE
[facility] - Surgery  Operative Note     Surgery Date: 11/21/2024      Surgeons and Role:     * Chong Galaviz DPM - Primary     Assisting Surgeon: Pito JARAMILLO     Pre-op Diagnosis:  Peroneal tendon tear, right, subsequent encounter [S86.311D]     Post-op Diagnosis:  Post-Op Diagnosis Codes:     * Peroneal tendon tear, right, subsequent encounter [S86.311D]     Procedure(s) (LRB):  REPAIR, TENDON, LOWER EXTREMITY (Right) Peroneal Brevis     Anesthesia: Regional/General    Hemostasis:  Thigh tourniquet set at 300 for 45 minutes    Estimated Blood Loss: 5 mL    Specimen: None    Culture: None    Complications: None    Condition: Stable    PRE-PROCEDURE:   The patient was brought into the operating room and placed on the operating table in the supine position. Pre op popliteal block done prior to entering the OR. The foot and leg was then scrubbed, prepped, and draped in the usual aseptic manner.     PROCEDURE:   Attention was directed to the lateral ankle right side where an incision was made from just posterior to the lateral malleolus distal to the base of the fifth metatarsal in a curvilinear fashion. The incision was made through dermis, then deepened via sharp and blunt dissection through subcutaneous tissue ensuring retract any major vessels and nerves. Any bleeding vessels encountered were cauterized. The incision was deepened to the peroneal tendon sheath which was carefully incised and opened to reveal the peroneal brevis and longus tendons. The tendons were inspected and the longus was noted to be in good condition without tears, the brevis however had a longitudinal tear posterior to the lateral malleolus with flattening of the tendon in this area and fibrofatty tendon strands. The diseased and fibrous fatty portions of the tendon were excised along with any synovitis within the tendon sheath. The tendon was then re tubularized with 2-0 ethibond. The tendon was placed back into its sheath deep to  the longus at the lateral malleolus and the peroneal retinaculum and tendon sheath were closed with 2-0 vicryl. The subcutaneous tissues were closed with 3-0 vicryl and the skin approximated with staples.      The incision was dressed with xeroform and covered with a sterile compressive dressing consisting of 4 X 4s and cast padding. An ACE wrap and well padded posterior splint was then applied with the foot slightly plantar flexed and everted. The patient tolerated the procedure and anesthesia well. The patient was transported to recovery with vital signs stable and vascular status intact.      Instructions dispensed for patient to remain non weight bearing to left foot, dressings to remain c/d/i and follow up in my clinic in 1 week    Chong Galaviz DPM

## 2024-11-21 NOTE — DISCHARGE INSTRUCTIONS
FOOT SURGERY  After surgery:    DO:  Keep leg elevated until first post operative visit.  Keep ice pack behind knee for 20 minutes each hour while awake for next 24-48 hours.  Keep dressing clean and dry. Do not change the bandages.  Advance diet as tolerated.   Check circulation frequently in toes by pressing down on toenail. Nail should turn white and then pink WITHIN 3 SECONDS when released.  No weight bearing on surgical foot  Do not walk on right foot  Resume home medications.    DO NOT:  Do not remove your dressing.  Do not get dressing wet.  Do not drive until released by your doctor.  Do not take additional tylenol/acetaminophen while taking narcotic pain medication that contains tylenol/acetaminophen.     CALL PHYSICIAN FOR:  Burning, or numbness of the toes not relieved by elevation of the leg.  Pale or cold toes.  Blue colored nail beds.  Redness, swelling, or bleeding.  Fever greater than 101,  Drainage (pus) from the operative site.  Pain unrelieved by pain medication.    FOR EMERGENCIES CONTACT YOUR PHYSICIAN'S OFFICE  [phone]

## 2024-11-21 NOTE — ANESTHESIA POSTPROCEDURE EVALUATION
Anesthesia Post Evaluation    Patient: Aidee Larose    Procedure(s) Performed: Procedure(s) (LRB):  REPAIR, TENDON, LOWER EXTREMITY (Right)    Final Anesthesia Type: general      Patient location during evaluation: PACU  Patient participation: Yes- Able to Participate  Level of consciousness: sedated and awake  Post-procedure vital signs: reviewed and stable  Pain management: adequate  Airway patency: patent    PONV status at discharge: No PONV  Anesthetic complications: no      Cardiovascular status: blood pressure returned to baseline and hemodynamically stable  Respiratory status: spontaneous ventilation  Hydration status: euvolemic  Follow-up not needed.              Vitals Value Taken Time   /60 11/21/24 1335   Temp 36.6 °C (97.8 °F) 11/21/24 1238   Pulse 68 11/21/24 1335   Resp 16 11/21/24 1335   SpO2 98 % 11/21/24 1335         No case tracking events are documented in the log.      Pain/Ruby Score: Pain Rating Prior to Med Admin: 1 (11/21/2024  9:58 AM)  Pain Rating Post Med Admin: 0 (11/21/2024 10:57 AM)  Ruby Score: 10 (11/21/2024  2:08 PM)

## 2024-11-21 NOTE — BRIEF OP NOTE
"[facility] - Surgery  Brief Operative Note    Surgery Date: 11/21/2024     Surgeons and Role:     * Chong Galaviz DPM - Primary    Assisting Surgeon: Pito JARAMILLO    Pre-op Diagnosis:  Peroneal tendon tear, right, subsequent encounter [S86.311D]    Post-op Diagnosis:  Post-Op Diagnosis Codes:     * Peroneal tendon tear, right, subsequent encounter [S86.311D]    Procedure(s) (LRB):  REPAIR, TENDON, LOWER EXTREMITY (Right)    Anesthesia: Regional/General    Operative Findings: Right peroneal brevis tendon was torn and fatty fibrous throughout the area from the lateral malleolus distal about 4-5 cm. Non viable tendon debrided and the tendon was re tubularized and repaired with 2-0 ethibond and wrapped in allowrap graft    Estimated Blood Loss: 5 mL         Specimens:   Specimen (24h ago, onward)      None              Discharge Note    OUTCOME: Patient tolerated treatment/procedure well without complication and is now ready for discharge.    DISPOSITION: Home or Self Care    FINAL DIAGNOSIS:  Peroneal tendon tear, right, subsequent encounter    FOLLOWUP: In clinic    DISCHARGE INSTRUCTIONS:    Discharge Procedure Orders   WALKER FOR HOME USE     Order Specific Question Answer Comments   Type of Walker: Adult (5'4"-6'6")    With wheels? No    Height: 4' 11" (1.499 m)    Weight: 83.5 kg (184 lb)    Length of need (1-99 months): 2    Please check all that apply: Patient's condition impairs ambulation.    Please check all that apply: Patient is unable to safely ambulate without equipment.      Diet general     Keep surgical extremity elevated     Ice to affected area   Order Comments: Ice behind the knee 15 minute intervals as needed for pain and swelling     Call MD for:  temperature >100.4     Call MD for:  persistent nausea and vomiting     Call MD for:  severe uncontrolled pain     Call MD for:  difficulty breathing, headache or visual disturbances     Call MD for:  redness, tenderness, or signs of infection " (pain, swelling, redness, odor or green/yellow discharge around incision site)     Call MD for:  hives     Call MD for:  persistent dizziness or light-headedness     Call MD for:  extreme fatigue     Leave dressing on - Keep it clean, dry, and intact until clinic visit     Weight bearing restrictions (specify)   Order Comments: Non weight bearing to the right foot     Chong Galaviz DPM

## 2024-11-22 ENCOUNTER — PATIENT MESSAGE (OUTPATIENT)
Dept: PODIATRY | Facility: CLINIC | Age: 59
End: 2024-11-22
Payer: COMMERCIAL

## 2024-11-26 ENCOUNTER — OFFICE VISIT (OUTPATIENT)
Dept: PODIATRY | Facility: CLINIC | Age: 59
End: 2024-11-26
Payer: COMMERCIAL

## 2024-11-26 VITALS — BODY MASS INDEX: 37.11 KG/M2 | WEIGHT: 184.06 LBS | HEIGHT: 59 IN

## 2024-11-26 DIAGNOSIS — Z98.890 POST-OPERATIVE STATE: Primary | ICD-10-CM

## 2024-11-26 PROCEDURE — 99999 PR PBB SHADOW E&M-EST. PATIENT-LVL III: CPT | Mod: PBBFAC,,, | Performed by: PODIATRIST

## 2024-11-26 NOTE — PROGRESS NOTES
Subjective:      Patient ID: Aidee Larose is a 59 y.o. female.    Chief Complaint: Post-op Evaluation    Aidee is a 59 y.o. female who presents to the podiatry clinic  with complaint of  right foot pain lateral ankle. Onset of the symptoms was  last summer over a year ago . Precipitating event: increased activity and overuse injury on her feet a lot . Current symptoms include: ability to bear weight, but with some pain, swelling, and worsening symptoms after a period of activity. Aggravating factors: any weight bearing. Symptoms have gradually worsened. Patient has had prior foot problems. Evaluation to date: MRI: abnormal peroneal tendon tear . Treatment to date:  tried PT twice, offloading in a boot and bracing . Patients rates pain 6/10 on pain scale.    11/26/24: Patient returns s/p 1 week right peroneal tendon repair. She returns in a posterior splint non weight bearing in a knee scooter. Relates minimal pain overall doing well    Review of Systems   Constitutional: Negative for chills and fever.   Cardiovascular:  Negative for claudication and leg swelling.   Respiratory:  Negative for shortness of breath.    Skin:  Negative for itching, nail changes and rash.   Musculoskeletal:  Negative for muscle cramps, muscle weakness and myalgias.        Right ankle pain   Gastrointestinal:  Negative for nausea and vomiting.   Neurological:  Negative for focal weakness, loss of balance, numbness and paresthesias.           Objective:      Physical Exam  Constitutional:       General: She is not in acute distress.     Appearance: She is well-developed. She is not diaphoretic.   Cardiovascular:      Pulses:           Dorsalis pedis pulses are 2+ on the right side and 2+ on the left side.        Posterior tibial pulses are 2+ on the right side and 2+ on the left side.      Comments: < 3 sec capillary refill time to toes 1-5 bilateral. Toes and feet are warm to touch proximally with normal distal cooling b/l.  There is some hair growth on the feet and toes b/l. There is no edema b/l. No spider veins or varicosities present b/l.     Musculoskeletal:      Comments: Equinus noted b/l ankles with < 10 deg DF noted. MMT 5/5 in DF/PF/Inv/Ev resistance with no reproduction of pain in any direction. Passive range of motion of ankle and pedal joints is painless b/l.     Skin:     General: Skin is warm and dry.      Coloration: Skin is not pale.      Findings: No abrasion, bruising, burn, ecchymosis, erythema, laceration, lesion, petechiae or rash.      Nails: There is no clubbing.      Comments: Skin temperature, texture and turgor within normal limits.    Right lateral foot/ankle incision well coapted with sutures intact no erythema dehiscence or drainage.    Neurological:      Mental Status: She is alert and oriented to person, place, and time.      Sensory: No sensory deficit.      Motor: No tremor, atrophy or abnormal muscle tone.      Comments: Negative tinel sign bilateral.   Psychiatric:         Behavior: Behavior normal.             Assessment:       Encounter Diagnosis   Name Primary?    Post-operative state Yes           Plan:       Aidee was seen today for post-op evaluation.    Diagnoses and all orders for this visit:    Post-operative state        I counseled the patient on her conditions, their implications and medical management.    Incision healing well covered with xeroform and gauze    Foot and lower leg well padded with cast padding and below knee case applied by me with foot in neutral position using 3 rolls of fiberglass cast material.       Return 1 week post op cast removal and suture removal, she will remain non weight bearing, bring the orthopedic boot next week to transition to the tall orthopedic boot    Chong Galaviz DPM

## 2024-12-03 ENCOUNTER — OFFICE VISIT (OUTPATIENT)
Dept: PODIATRY | Facility: CLINIC | Age: 59
End: 2024-12-03
Payer: COMMERCIAL

## 2024-12-03 DIAGNOSIS — Z98.890 POST-OPERATIVE STATE: Primary | ICD-10-CM

## 2024-12-03 PROCEDURE — 99999 PR PBB SHADOW E&M-EST. PATIENT-LVL III: CPT | Mod: PBBFAC,,, | Performed by: PODIATRIST

## 2024-12-03 NOTE — PROGRESS NOTES
Subjective:      Patient ID: Aidee Larose is a 59 y.o. female.    Chief Complaint: Post-op Evaluation    Aidee is a 59 y.o. female who presents to the podiatry clinic  with complaint of  right foot pain lateral ankle. Onset of the symptoms was  last summer over a year ago . Precipitating event: increased activity and overuse injury on her feet a lot . Current symptoms include: ability to bear weight, but with some pain, swelling, and worsening symptoms after a period of activity. Aggravating factors: any weight bearing. Symptoms have gradually worsened. Patient has had prior foot problems. Evaluation to date: MRI: abnormal peroneal tendon tear . Treatment to date:  tried PT twice, offloading in a boot and bracing . Patients rates pain 6/10 on pain scale.    11/26/24: Patient returns s/p 1 week right peroneal tendon repair. She returns in a posterior splint non weight bearing in a knee scooter. Relates minimal pain overall doing well    12/3/24: Patient returns s/p 2 weeks right peroneal tendon repair, she is in the cast non weight bearing    Review of Systems   Constitutional: Negative for chills and fever.   Cardiovascular:  Negative for claudication and leg swelling.   Respiratory:  Negative for shortness of breath.    Skin:  Negative for itching, nail changes and rash.   Musculoskeletal:  Negative for muscle cramps, muscle weakness and myalgias.        Right ankle pain   Gastrointestinal:  Negative for nausea and vomiting.   Neurological:  Negative for focal weakness, loss of balance, numbness and paresthesias.           Objective:      Physical Exam  Constitutional:       General: She is not in acute distress.     Appearance: She is well-developed. She is not diaphoretic.   Cardiovascular:      Pulses:           Dorsalis pedis pulses are 2+ on the right side and 2+ on the left side.        Posterior tibial pulses are 2+ on the right side and 2+ on the left side.      Comments: < 3 sec capillary  refill time to toes 1-5 bilateral. Toes and feet are warm to touch proximally with normal distal cooling b/l. There is some hair growth on the feet and toes b/l. There is no edema b/l. No spider veins or varicosities present b/l.     Musculoskeletal:      Comments: Equinus noted b/l ankles with < 10 deg DF noted. MMT 5/5 in DF/PF/Inv/Ev resistance with no reproduction of pain in any direction. Passive range of motion of ankle and pedal joints is painless b/l.     Skin:     General: Skin is warm and dry.      Coloration: Skin is not pale.      Findings: No abrasion, bruising, burn, ecchymosis, erythema, laceration, lesion, petechiae or rash.      Nails: There is no clubbing.      Comments: Skin temperature, texture and turgor within normal limits.    Right lateral foot/ankle incision well coapted with staples intact no erythema dehiscence or drainage. Staples removed today   Neurological:      Mental Status: She is alert and oriented to person, place, and time.      Sensory: No sensory deficit.      Motor: No tremor, atrophy or abnormal muscle tone.      Comments: Negative tinel sign bilateral.   Psychiatric:         Behavior: Behavior normal.             Assessment:       Encounter Diagnosis   Name Primary?    Post-operative state Yes             Plan:       Aidee was seen today for post-op evaluation.    Diagnoses and all orders for this visit:    Post-operative state          I counseled the patient on her conditions, their implications and medical management.    Incision healing well staples removed    Can use an orthopedic boot at this point, no weight bearing for 1 more week, then can begin walking but only in the boot      Return 2 weeks for follow up    Chong Galaviz DPM

## 2024-12-17 ENCOUNTER — OFFICE VISIT (OUTPATIENT)
Dept: PODIATRY | Facility: CLINIC | Age: 59
End: 2024-12-17
Payer: COMMERCIAL

## 2024-12-17 VITALS — HEIGHT: 59 IN | WEIGHT: 184.06 LBS | BODY MASS INDEX: 37.11 KG/M2

## 2024-12-17 DIAGNOSIS — T81.31XA DEHISCENCE OF OPERATIVE WOUND, INITIAL ENCOUNTER: ICD-10-CM

## 2024-12-17 DIAGNOSIS — Z98.890 POST-OPERATIVE STATE: Primary | ICD-10-CM

## 2024-12-17 PROCEDURE — 99999 PR PBB SHADOW E&M-EST. PATIENT-LVL III: CPT | Mod: PBBFAC,,, | Performed by: PODIATRIST

## 2024-12-17 RX ORDER — PHENTERMINE HYDROCHLORIDE 37.5 MG/1
CAPSULE ORAL 2 TIMES DAILY
COMMUNITY
Start: 2024-11-09

## 2024-12-17 NOTE — PROGRESS NOTES
Subjective:      Patient ID: Aidee Larose is a 59 y.o. female.    Chief Complaint: Post-op Evaluation    Aidee is a 59 y.o. female who presents to the podiatry clinic  with complaint of  right foot pain lateral ankle. Onset of the symptoms was  last summer over a year ago . Precipitating event: increased activity and overuse injury on her feet a lot . Current symptoms include: ability to bear weight, but with some pain, swelling, and worsening symptoms after a period of activity. Aggravating factors: any weight bearing. Symptoms have gradually worsened. Patient has had prior foot problems. Evaluation to date: MRI: abnormal peroneal tendon tear . Treatment to date:  tried PT twice, offloading in a boot and bracing . Patients rates pain 6/10 on pain scale.    11/26/24: Patient returns s/p 1 week right peroneal tendon repair. She returns in a posterior splint non weight bearing in a knee scooter. Relates minimal pain overall doing well    12/3/24: Patient returns s/p 2 weeks right peroneal tendon repair, she is in the cast non weight bearing    12/17/24: Patient returns s/p 4 weeks right peroneal tendon repair, she is in the boot weight bearing at home but still uses the scooter when out and about longer distances. Minimal pain or discomfort noted.    Review of Systems   Constitutional: Negative for chills and fever.   Cardiovascular:  Negative for claudication and leg swelling.   Respiratory:  Negative for shortness of breath.    Skin:  Negative for itching, nail changes and rash.   Musculoskeletal:  Negative for muscle cramps, muscle weakness and myalgias.        Right ankle pain   Gastrointestinal:  Negative for nausea and vomiting.   Neurological:  Negative for focal weakness, loss of balance, numbness and paresthesias.           Objective:      Physical Exam  Constitutional:       General: She is not in acute distress.     Appearance: She is well-developed. She is not diaphoretic.   Cardiovascular:       Pulses:           Dorsalis pedis pulses are 2+ on the right side and 2+ on the left side.        Posterior tibial pulses are 2+ on the right side and 2+ on the left side.      Comments: < 3 sec capillary refill time to toes 1-5 bilateral. Toes and feet are warm to touch proximally with normal distal cooling b/l. There is some hair growth on the feet and toes b/l. There is no edema b/l. No spider veins or varicosities present b/l.     Musculoskeletal:      Comments: Equinus noted b/l ankles with < 10 deg DF noted. MMT 5/5 in DF/PF/Inv/Ev resistance with no reproduction of pain in any direction. Passive range of motion of ankle and pedal joints is painless b/l.     Skin:     General: Skin is warm and dry.      Coloration: Skin is not pale.      Findings: No abrasion, bruising, burn, ecchymosis, erythema, laceration, lesion, petechiae or rash.      Nails: There is no clubbing.      Comments: Skin temperature, texture and turgor within normal limits.    Right lateral foot/ankle incision with superficial dehiscence noted mid incision about 3 cm long with granular base, does not probe deep   Neurological:      Mental Status: She is alert and oriented to person, place, and time.      Sensory: No sensory deficit.      Motor: No tremor, atrophy or abnormal muscle tone.      Comments: Negative tinel sign bilateral.   Psychiatric:         Behavior: Behavior normal.             Assessment:       Encounter Diagnoses   Name Primary?    Post-operative state Yes    Dehiscence of operative wound, initial encounter                Plan:       Aidee was seen today for post-op evaluation.    Diagnoses and all orders for this visit:    Post-operative state    Dehiscence of operative wound, initial encounter            I counseled the patient on her conditions, their implications and medical management.    Incision opened up some, directed to cover with band aids daily until healed    Continue ambulating in the boot only    Return 3  weeks for follow up dehiscence and to begin ambulating in shoes thereafter if doing well    Chong Galaviz DPM

## 2025-01-03 ENCOUNTER — PATIENT MESSAGE (OUTPATIENT)
Dept: PODIATRY | Facility: CLINIC | Age: 60
End: 2025-01-03
Payer: COMMERCIAL

## 2025-01-03 NOTE — TELEPHONE ENCOUNTER
Message was routed to the provider for advisement. Message was sent via [employer] to patient as well.

## 2025-01-08 ENCOUNTER — OFFICE VISIT (OUTPATIENT)
Dept: PODIATRY | Facility: CLINIC | Age: 60
End: 2025-01-08
Payer: COMMERCIAL

## 2025-01-08 VITALS — HEIGHT: 59 IN | BODY MASS INDEX: 37.11 KG/M2 | WEIGHT: 184.06 LBS

## 2025-01-08 DIAGNOSIS — Z98.890 POST-OPERATIVE STATE: Primary | ICD-10-CM

## 2025-01-08 PROCEDURE — 99024 POSTOP FOLLOW-UP VISIT: CPT | Mod: S$GLB,,, | Performed by: PODIATRIST

## 2025-01-08 PROCEDURE — 99999 PR PBB SHADOW E&M-EST. PATIENT-LVL III: CPT | Mod: PBBFAC,,, | Performed by: PODIATRIST

## 2025-01-08 PROCEDURE — 1159F MED LIST DOCD IN RCRD: CPT | Mod: CPTII,S$GLB,, | Performed by: PODIATRIST

## 2025-01-08 PROCEDURE — 1160F RVW MEDS BY RX/DR IN RCRD: CPT | Mod: CPTII,S$GLB,, | Performed by: PODIATRIST

## 2025-01-08 NOTE — PROGRESS NOTES
Subjective:      Patient ID: Aidee Larose is a 59 y.o. female.    Chief Complaint: Post-op Evaluation    Aidee is a 59 y.o. female who presents to the podiatry clinic  with complaint of  right foot pain lateral ankle. Onset of the symptoms was  last summer over a year ago . Precipitating event: increased activity and overuse injury on her feet a lot . Current symptoms include: ability to bear weight, but with some pain, swelling, and worsening symptoms after a period of activity. Aggravating factors: any weight bearing. Symptoms have gradually worsened. Patient has had prior foot problems. Evaluation to date: MRI: abnormal peroneal tendon tear . Treatment to date:  tried PT twice, offloading in a boot and bracing . Patients rates pain 6/10 on pain scale.    11/26/24: Patient returns s/p 1 week right peroneal tendon repair. She returns in a posterior splint non weight bearing in a knee scooter. Relates minimal pain overall doing well    12/3/24: Patient returns s/p 2 weeks right peroneal tendon repair, she is in the cast non weight bearing    12/17/24: Patient returns s/p 4 weeks right peroneal tendon repair, she is in the boot weight bearing at home but still uses the scooter when out and about longer distances. Minimal pain or discomfort noted.    1/8/25: patient returns s/p 7 weeks peroneal tendon repair, she ambulates in the boot without pain. Doing well overall    Review of Systems   Constitutional: Negative for chills and fever.   Cardiovascular:  Negative for claudication and leg swelling.   Respiratory:  Negative for shortness of breath.    Skin:  Negative for itching, nail changes and rash.   Musculoskeletal:  Negative for muscle cramps, muscle weakness and myalgias.        Right ankle pain   Gastrointestinal:  Negative for nausea and vomiting.   Neurological:  Negative for focal weakness, loss of balance, numbness and paresthesias.           Objective:      Physical Exam  Constitutional:        General: She is not in acute distress.     Appearance: She is well-developed. She is not diaphoretic.   Cardiovascular:      Pulses:           Dorsalis pedis pulses are 2+ on the right side and 2+ on the left side.        Posterior tibial pulses are 2+ on the right side and 2+ on the left side.      Comments: < 3 sec capillary refill time to toes 1-5 bilateral. Toes and feet are warm to touch proximally with normal distal cooling b/l. There is some hair growth on the feet and toes b/l. There is no edema b/l. No spider veins or varicosities present b/l.     Musculoskeletal:      Comments: Equinus noted b/l ankles with < 10 deg DF noted. MMT 5/5 in DF/PF/Inv/Ev resistance with no reproduction of pain in any direction. Passive range of motion of ankle and pedal joints is painless b/l.    No pain with palpation or ROM along the peroneal tendon or ankle ROM   Skin:     General: Skin is warm and dry.      Coloration: Skin is not pale.      Findings: No abrasion, bruising, burn, ecchymosis, erythema, laceration, lesion, petechiae or rash.      Nails: There is no clubbing.      Comments: Skin temperature, texture and turgor within normal limits.    Right lateral foot/ankle incision now well healed   Neurological:      Mental Status: She is alert and oriented to person, place, and time.      Sensory: No sensory deficit.      Motor: No tremor, atrophy or abnormal muscle tone.      Comments: Negative tinel sign bilateral.   Psychiatric:         Behavior: Behavior normal.             Assessment:       Encounter Diagnosis   Name Primary?    Post-operative state Yes               Plan:       Aidee was seen today for post-op evaluation.    Diagnoses and all orders for this visit:    Post-operative state  -     Ambulatory Referral/Consult to Physical Therapy; Future            I counseled the patient on her conditions, their implications and medical management.    Incision now well healed    No pain to the foot with ROM or motion  exercises    Can transition to a supportive shoe with a compression type brace on the ankle    Will begin PT    Return 1 month for follow up on PT and transition to shoes    Chogn Galaviz DPM

## 2025-01-15 ENCOUNTER — CLINICAL SUPPORT (OUTPATIENT)
Dept: REHABILITATION | Facility: HOSPITAL | Age: 60
End: 2025-01-15
Attending: PODIATRIST
Payer: COMMERCIAL

## 2025-01-15 DIAGNOSIS — R29.898 WEAKNESS OF BOTH LOWER EXTREMITIES: ICD-10-CM

## 2025-01-15 DIAGNOSIS — Z98.890 POST-OPERATIVE STATE: ICD-10-CM

## 2025-01-15 DIAGNOSIS — M25.671 DECREASED RANGE OF MOTION OF RIGHT ANKLE: Primary | ICD-10-CM

## 2025-01-15 PROCEDURE — 97161 PT EVAL LOW COMPLEX 20 MIN: CPT | Mod: PO

## 2025-01-15 PROCEDURE — 97530 THERAPEUTIC ACTIVITIES: CPT | Mod: PO

## 2025-01-15 NOTE — PLAN OF CARE
[facility]   Physical Therapy Initial Evaluation      Name: Aidee Larose  Clinic Number: [id number]    Therapy Diagnosis:   Encounter Diagnoses   Name Primary?    Post-operative state     Decreased range of motion of right ankle Yes    Weakness of both lower extremities         Physician: Chong Galaviz DPM    Physician Orders: PT Eval and Treat   Medical Diagnosis from Referral: Z98.890 (ICD-10-CM) - Post-operative state   Evaluation Date: 1/15/2025  Authorization Period Expiration: 2025  Plan of Care Expiration: 3/12/2025  Progress Note Due: 2/15/2025  Date of Surgery: 2024 R peroneal tendon repair  Visit # / Visits authorized:    FOTO: 1/ 3    Precautions: Standard     Time In: 0800  Time Out: 0845  Total Billable Time: 45 minutes    Subjective     Date of onset: 2024    History of current condition - Aidee reports: receiving a R peroneal tendon repair on 2024. She reports mild discomfort with walking on the lateral aspect of her R foot and reports discontinuation of CAM boot wear 1 week ago.     Falls: None    Imaging: See EPIC    Prior Therapy: none  Social History: lives with    Occupation: [employer]  Prior Level of Function: ind  Current Level of Function: ind c pain    Pain:  Current 0/10, worst 3/10, best 0/10   Location: right lateral foot   Description: Aching  Aggravating Factors: Walking  Easing Factors: rest    Patients goals: improve overall function     Medical History:   Past Medical History:   Diagnosis Date    Hypertension     Hypothyroidism     Left navicular fracture of foot     Miscarriage     x2 first trimester    PE (pulmonary embolism)     after a foot fracture in     PONV (postoperative nausea and vomiting)     Thyroid disease     hypothyroidism       Surgical History:   Aidee Larose  has a past surgical history that includes Tubal ligation;  section; Pelvic laparoscopy; Laser  ablation; breast reduction; Hysterectomy (2/22/2013); Oophorectomy (02/22/2013); Belt abdominoplasty (2/22/2013); Carpal tunnel release (Bilateral, 2/14 and 4/14); Total Reduction Mammoplasty (Bilateral, 1992); Colonoscopy (N/A, 4/9/2018); and Repair of tendon of lower extremity (Right, 11/21/2024).    Medications:   Aidee has a current medication list which includes the following prescription(s): amlodipine, levothyroxine, multivitamin, oxycodone-acetaminophen, phentermine, phentermine, and valacyclovir.    Allergies:   Review of patient's allergies indicates:  No Known Allergies     Objective        Proximal/distal screen:     Range of Motion(*= pain):   Ankle Right   DF (knee extended) neutral   Plantarflexion 65 deg   Inversion 40 deg   Eversion 20 deg      Strength (*=pain):  Ankle Right   Dorsiflexion 5/5   Plantarflexion 5/5   Inversion 4/5   Eversion 4/5   Hip Abduction 4/5   Hip extension 4/5     Joint Mobility: decreased eversion and DF on the R    Palpation: No TTP    Sensation: SILT      Gait: pt ambulated independently in rocker bottom shoes      Intake Outcome Measure for FOTO foot Survey    Therapist reviewed FOTO scores for Aidee Larose on 1/15/2025.   FOTO report - see Media section or FOTO account episode details.    Intake Score: 41% limit         Treatment     Total Treatment time (time-based codes) separate from Evaluation: 15 minutes     Aidee received the treatments listed below:        therapeutic activities to improve functional performance for 15  minutes, including:  Banded clamshell c RTB 3x10 c 3 sec hold  Ankle 4 way c GTB 3x10        Patient Education and Home Exercises     Education provided:   - on HEP and POC    Written Home Exercises Provided: Yes. Exercises were reviewed and Aidee was able to demonstrate them prior to the end of the session.  Aidee demonstrated good  understanding of the education provided. See EMR under Patient Instructions for exercises provided  during therapy sessions.    Assessment     Aidee is a 59 y.o. female referred to outpatient Physical Therapy with a medical diagnosis of Z98.890 (ICD-10-CM) - Post-operative state. Patient presents with increased pain and decreased ROM, strength, and flexibility. These deficits limit the patient from performing everyday activities to include walking, standing, bending, jumping, jogging, and navigating stairs without pain or limitations. Pt appears to be healing well along post-op timelines at this time. Primary deficits still seen with eversion and DF range of motion and proximal BLE strength. These deficits were addressed with a HEP to target these areas with good overall performance. Due to these deficits, pt will benefit from skilled PT services to improve mobility, control pain, and restore overall function.    Patient prognosis is Good.   Patient will benefit from skilled outpatient Physical Therapy to address the deficits stated above and in the chart below, provide patient /family education, and to maximize patientt's level of independence.     Plan of care discussed with patient: Yes  Patient's spiritual, cultural and educational needs considered and patient is agreeable to the plan of care and goals as stated below:     Anticipated Barriers for therapy: none    Medical Necessity is demonstrated by the following  History  Co-morbidities and personal factors that may impact the plan of care [x] LOW: no personal factors / co-morbidities  [] MODERATE: 1-2 personal factors / co-morbidities  [] HIGH: 3+ personal factors / co-morbidities    Moderate / High Support Documentation:   Co-morbidities affecting plan of care:     Personal Factors:   no deficits     Examination  Body Structures and Functions, activity limitations and participation restrictions that may impact the plan of care [x] LOW: addressing 1-2 elements  [] MODERATE: 3+ elements  [] HIGH: 4+ elements (please support below)    Moderate / High Support  Documentation:      Clinical Presentation [x] LOW: stable  [] MODERATE: Evolving  [] HIGH: Unstable     Decision Making/ Complexity Score: low       Goals:  Short Term Goals: 5 weeks   Pt to report worst pain 1/10  Pt to improve R DF range of motion by 5 deg  Pt to improve BLE strength by 1/2 grade  Pt to improve FOTO by 10%    Long Term Goals: 10 weeks   Pt to report worst pain 0/10  Pt to improve R DF range of motion by 10 deg  Pt to improve BLE strength by 1 grade  Pt to improve FOTO by 20%    Plan     Plan of care Certification: 1/15/2025 to 3/12/2025.    Outpatient Physical Therapy 2 times weekly for 8 weeks to include the following interventions: Gait Training, Manual Therapy, Moist Heat/ Ice, Neuromuscular Re-ed, Patient Education, Self Care, Therapeutic Activities, and Therapeutic Exercise.       Sridhar Smith, PT        Physician's Signature: _________________________________________ Date: ________________

## 2025-01-17 ENCOUNTER — CLINICAL SUPPORT (OUTPATIENT)
Dept: REHABILITATION | Facility: HOSPITAL | Age: 60
End: 2025-01-17
Payer: COMMERCIAL

## 2025-01-17 DIAGNOSIS — M25.671 DECREASED RANGE OF MOTION OF RIGHT ANKLE: Primary | ICD-10-CM

## 2025-01-17 DIAGNOSIS — R29.898 WEAKNESS OF BOTH LOWER EXTREMITIES: ICD-10-CM

## 2025-01-17 PROCEDURE — 97140 MANUAL THERAPY 1/> REGIONS: CPT | Mod: PO,CQ

## 2025-01-17 PROCEDURE — 97112 NEUROMUSCULAR REEDUCATION: CPT | Mod: PO,CQ

## 2025-01-17 PROCEDURE — 97110 THERAPEUTIC EXERCISES: CPT | Mod: PO,CQ

## 2025-01-17 NOTE — PROGRESS NOTES
[facility]   Physical Therapy Treatment Note     Name: Aidee Hernandez [facility]  Clinic Number: [id number]    Therapy Diagnosis:   Encounter Diagnoses   Name Primary?    Decreased range of motion of right ankle Yes    Weakness of both lower extremities      Physician: Chong Galaviz DPM    Visit Date: 1/17/2025  Physician Orders: PT Eval and Treat   Medical Diagnosis from Referral: Z98.890 (ICD-10-CM) - Post-operative state   Evaluation Date: 1/15/2025  Authorization Period Expiration: 12/31/2025  Plan of Care Expiration: 3/12/2025  Progress Note Due: 2/15/2025  Date of Surgery: 11/21/2024 R peroneal tendon repair  Visit # / Visits authorized: 1/ 1   FOTO: 1/ 3     Precautions: Standard      Time In: 8:10  Time Out: 0845  Total Billable Time: 45 minutes      SUBJECTIVE     Pt reports: that her ankle is still stiff. Reports 3/10 pn with amb.  She was compliant with home exercise program.  Response to previous treatment: no adverse effects  Functional change: too soon to assess    Pain: 3/10  Location: right ankles     OBJECTIVE     Objective Measures updated at progress report unless specified.     Treatment     Aidee received the treatments listed below:      therapeutic exercises to develop strength, endurance, ROM, flexibility, and posture for 30 minutes including:  Recumbent stat bike x 10 min  GSS x 2 min incline  Soleus stretch x 2 min seated with roll  Seated Heel Slide 30x  Alphabet x 2  Ankle 4 way red t-band 30x    manual therapy techniques: Joint mobilizations, Manual traction, and Soft tissue Mobilization were applied to the: R ankle for 10 minutes, including:  Gentle distraction, grade II ankle tib/george jt mobs PF/DF    neuromuscular re-education activities to improve: Balance, Coordination, Kinesthetic, Sense, and Proprioception for 20 minutes. The following activities were included:  NBOS EO 3x 30 sec  NBOS EC 3x 30 sec  Tandem stance 3x 30 sec  Towel crunch 3 min  Golf  ball x 3 min  [place]  2x    therapeutic activities to improve functional performance for 00  minutes, including:              Patient Education and Home Exercises     Home Exercises Provided and Patient Education Provided     Education provided:   - effects of therapy    Written Home Exercises Provided: Patient instructed to cont prior HEP. Exercises were reviewed and Aidee was able to demonstrate them prior to the end of the session.  Aidee demonstrated good  understanding of the education provided. See EMR under Patient Instructions for exercises provided during therapy sessions    ASSESSMENT     Aidee tony today's tx with initiation of ther ex, neuromuscular re-ed and manual intervention well. She presents with mod edema at lateral malleoli with well healed incision. She was able to perform all activities w/o difficulty or complaint. Will continue to focus on ankle strength and ROM going forward, progressing at her tony.     Aidee Is progressing well towards her goals.   Pt prognosis is Good.     Pt will continue to benefit from skilled outpatient physical therapy to address the deficits listed in the problem list box on initial evaluation, provide pt/family education and to maximize pt's level of independence in the home and community environment.     Pt's spiritual, cultural and educational needs considered and pt agreeable to plan of care and goals.     Anticipated barriers to physical therapy: none    Goals: Short Term Goals: 5 weeks   Pt to report worst pain 1/10  Pt to improve R DF range of motion by 5 deg  Pt to improve BLE strength by 1/2 grade  Pt to improve FOTO by 10%     Long Term Goals: 10 weeks   Pt to report worst pain 0/10  Pt to improve R DF range of motion by 10 deg  Pt to improve BLE strength by 1 grade  Pt to improve FOTO by 20%    PLAN     Plan of care Certification: 1/15/2025 to 3/12/2025.     Outpatient Physical Therapy 2 times weekly for 8 weeks to include the following interventions:  Gait Training, Manual Therapy, Moist Heat/ Ice, Neuromuscular Re-ed, Patient Education, Self Care, Therapeutic Activities, and Therapeutic Exercise.     Josh Gurrola, PTA

## 2025-01-24 ENCOUNTER — CLINICAL SUPPORT (OUTPATIENT)
Dept: REHABILITATION | Facility: HOSPITAL | Age: 60
End: 2025-01-24
Payer: COMMERCIAL

## 2025-01-24 DIAGNOSIS — R26.89 FUNCTIONAL GAIT ABNORMALITY: ICD-10-CM

## 2025-01-24 DIAGNOSIS — R29.898 ANKLE WEAKNESS: ICD-10-CM

## 2025-01-24 DIAGNOSIS — M25.671 DECREASED RANGE OF MOTION OF RIGHT ANKLE: Primary | ICD-10-CM

## 2025-01-24 PROCEDURE — 97110 THERAPEUTIC EXERCISES: CPT | Mod: PO

## 2025-01-24 PROCEDURE — 97112 NEUROMUSCULAR REEDUCATION: CPT | Mod: PO

## 2025-01-24 NOTE — PROGRESS NOTES
Outpatient Rehab    Physical Therapy Visit    Patient Name: Aidee Larose  MRN: [id number]  YOB: 1965  Today's Date: 2025    Therapy Diagnosis: No diagnosis found.  Physician: Chong Galaviz DPM    Physician Orders: Eval and Treat    Medical Diagnosis from Referral: Z98.890 (ICD-10-CM) - Post-operative state   Evaluation Date: 1/15/2025  Authorization Period Expiration: 2025  Plan of Care Expiration: 3/12/2025  Progress Note Due: 2/15/2025  Date of Surgery: 2024 R peroneal tendon repair  Visit # / Visits authorized:    FOTO: 1/ 3     Precautions: Standard      Time In:0800   Time Out: 0855   Total time: 55 mins        Subjective   110 pain with walking, but improving.  Pain reported as 1.      Past Medical History/Physical Systems Review:   Aidee Larose  has a past medical history of Hypertension, Hypothyroidism, Left navicular fracture of foot, Miscarriage, PE (pulmonary embolism), PONV (postoperative nausea and vomiting), and Thyroid disease.    Aidee Larose  has a past surgical history that includes Tubal ligation;  section; Pelvic laparoscopy; Laser ablation; breast reduction; Hysterectomy (2013); Oophorectomy (2013); Belt abdominoplasty (2013); Carpal tunnel release (Bilateral,  and ); Total Reduction Mammoplasty (Bilateral, ); Colonoscopy (N/A, 2018); and Repair of tendon of lower extremity (Right, 2024).    Aidee has a current medication list which includes the following prescription(s): amlodipine, levothyroxine, multivitamin, oxycodone-acetaminophen, phentermine, phentermine, and valacyclovir.    Review of patient's allergies indicates:  No Known Allergies     Objective            Treatment:  Therapeutic Exercise  Therapeutic Exercise Activity 1: Recumbent stat bike x 10 min  Therapeutic Exercise Activity 2: GSS x 2 min incline  Therapeutic Exercise Activity 3: Soleus stretch x 2  min seated with roll  Therapeutic Exercise Activity 4: Seated Heel Slide 30x  Therapeutic Exercise Activity 5: Alphabet x 2  A  Therapeutic Exercise Activity 6: Ankle 4 way red t-band 30x  Therapeutic Exercise Activity 7: Towel curl drags c 5# weight x3 towel lengths    Balance/Neuromuscular Re-Education  Balance/Neuromuscular Re-Education Activity 1: NBOS EO 3x 30 sec  Balance/Neuromuscular Re-Education Activity 2: NBOS EC 3x 30 sec  Balance/Neuromuscular Re-Education Activity 3: Tandem stance 3x 30 sec  Balance/Neuromuscular Re-Education Activity 4: Towel crunch 3 min  Balance/Neuromuscular Re-Education Activity 5: Golf ball x 3 min  Balance/Neuromuscular Re-Education Activity 6: [place]  2x  Balance/Neuromuscular Re-Education Activity 7: SL clamshell c GTB 3x10 c 3 sec hold    Patient's spiritual, cultural, and educational needs considered and patient agreeable to plan of care and goals.     Assessment & Plan   Assessment: Pt tolerated tx well today. Increased soreness notes following SL clamshell activity, but no adverse reactions noted. Pt will continue to benefit from skilled PT going forward to maximize return of PLOF.           Plan: Plan of care Certification: 1/15/2025 to 3/12/2025.     Outpatient Physical Therapy 2 times weekly for 8 weeks to include the following interventions: Gait Training, Manual Therapy, Moist Heat/ Ice, Neuromuscular Re-ed, Patient Education, Self Care, Therapeutic Activities, and Therapeutic Exercise.    Goals:   Active       Physical Therapy       Physical Therapy Goal (Progressing)       Start:  01/24/25    Expected End:  03/12/25       Goals: Short Term Goals: 5 weeks   1. Pt to report worst pain 1/10  2. Pt to improve R DF range of motion by 5 deg  3. Pt to improve BLE strength by 1/2 grade  4. Pt to improve FOTO by 10%     Long Term Goals: 10 weeks   5. Pt to report worst pain 0/10  6. Pt to improve R DF range of motion by 10 deg  7. Pt to improve BLE strength by 1  grade  8. Pt to improve FOTO by 20%

## 2025-01-27 ENCOUNTER — CLINICAL SUPPORT (OUTPATIENT)
Dept: REHABILITATION | Facility: HOSPITAL | Age: 60
End: 2025-01-27
Payer: COMMERCIAL

## 2025-01-27 DIAGNOSIS — M25.671 DECREASED RANGE OF MOTION OF RIGHT ANKLE: Primary | ICD-10-CM

## 2025-01-27 DIAGNOSIS — R29.898 WEAKNESS OF BOTH LOWER EXTREMITIES: ICD-10-CM

## 2025-01-27 PROCEDURE — 97112 NEUROMUSCULAR REEDUCATION: CPT | Mod: PO,CQ

## 2025-01-27 PROCEDURE — 97110 THERAPEUTIC EXERCISES: CPT | Mod: PO,CQ

## 2025-01-27 NOTE — PROGRESS NOTES
Outpatient Rehab  Physical Therapy Visit    Patient Name: Aidee Larose  MRN: [id number]  YOB: 1965  Today's Date: 2025    Therapy Diagnosis:   Encounter Diagnoses   Name Primary?    Decreased range of motion of right ankle Yes    Weakness of both lower extremities      Physician: Chong Galaviz DPM    Physician Orders: Eval and Treat    Medical Diagnosis from Referral: Z98.890 (ICD-10-CM) - Post-operative state   Evaluation Date: 1/15/2025  Authorization Period Expiration: 2025  Plan of Care Expiration: 3/12/2025  Progress Note Due: 2/15/2025  Date of Surgery: 2024 R peroneal tendon repair  Visit # / Visits authorized: 3/20   FOTO: 1/ 3     Precautions: Standard      Time In: 0800 AM  Time Out: 0855   Total time: 55 mins    Subjective   her ankle is currently pain free. Patients states it is feeling better each day..  Pain reported as 0.      Past Medical History/Physical Systems Review:   Aidee Larose  has a past medical history of Hypertension, Hypothyroidism, Left navicular fracture of foot, Miscarriage, PE (pulmonary embolism), PONV (postoperative nausea and vomiting), and Thyroid disease.    Aidee Larose  has a past surgical history that includes Tubal ligation;  section; Pelvic laparoscopy; Laser ablation; breast reduction; Hysterectomy (2013); Oophorectomy (2013); Belt abdominoplasty (2013); Carpal tunnel release (Bilateral,  and ); Total Reduction Mammoplasty (Bilateral, ); Colonoscopy (N/A, 2018); and Repair of tendon of lower extremity (Right, 2024).    Aidee has a current medication list which includes the following prescription(s): amlodipine, levothyroxine, multivitamin, oxycodone-acetaminophen, phentermine, phentermine, and valacyclovir.    Review of patient's allergies indicates:  No Known Allergies     Objective           Treatment:  Therapeutic Exercise  Therapeutic Exercise  Activity 1: Recumbent bike x 10 minutes  Therapeutic Exercise Activity 2: Standing gastroc stretch on incline x 1 minute x 2  Therapeutic Exercise Activity 3: Seated soleus stretch with foam under knee x 30 sec x 3  Therapeutic Exercise Activity 4: 4 way ankle (Red TB) 3x10  Therapeutic Exercise Activity 5: Towel curl drags c 5# weight x 3 towel lengths  Therapeutic Exercise Activity 6: Seated Heel Slide 30x    Balance/Neuromuscular Re-Education  Balance/Neuromuscular Re-Education Activity 1: Ankle circles CW/CCW 2x10  Balance/Neuromuscular Re-Education Activity 2: [place]  2x  Balance/Neuromuscular Re-Education Activity 3: NBOS on air ex pad: EO x 1 minute x 2  Balance/Neuromuscular Re-Education Activity 4: NBOS EC on ground x 30 sec x 2  Balance/Neuromuscular Re-Education Activity 5: Tandem stance 3 x 30 sec ea  Balance/Neuromuscular Re-Education Activity 6: SLS x 15 sec x 2 ea  Balance/Neuromuscular Re-Education Activity 7: S/L clamshell (green TB) 3x10, 3 sec hold  Balance/Neuromuscular Re-Education Activity 8: Standing hip abduction 2x20 (Cues for level pelvis and stability)    Patient's spiritual, cultural, and educational needs considered and patient agreeable to plan of care and goals.     Assessment & Plan   Assessment: Aidee able to progress to narrow base on air ex pad without loss of balance but postural sway is noted. Difficulty with resisted eversion due to weakness and decreased motor control but she is able to complete without pain. No complaints of pain with single limb stance.  Evaluation/Treatment Tolerance: Patient tolerated treatment well        Plan: Continue current POC with emphasis on improving ankle stability and neuromuscular control.    Goals:   Active       Physical Therapy       Physical Therapy Goal (Progressing)       Start:  01/24/25    Expected End:  03/12/25       Goals: Short Term Goals: 5 weeks   1. Pt to report worst pain 1/10  2. Pt to improve R DF range of motion by 5  deg  3. Pt to improve BLE strength by 1/2 grade  4. Pt to improve FOTO by 10%     Long Term Goals: 10 weeks   5. Pt to report worst pain 0/10  6. Pt to improve R DF range of motion by 10 deg  7. Pt to improve BLE strength by 1 grade  8. Pt to improve FOTO by 20%

## 2025-01-29 ENCOUNTER — CLINICAL SUPPORT (OUTPATIENT)
Dept: REHABILITATION | Facility: HOSPITAL | Age: 60
End: 2025-01-29
Payer: COMMERCIAL

## 2025-01-29 DIAGNOSIS — R29.898 WEAKNESS OF BOTH LOWER EXTREMITIES: ICD-10-CM

## 2025-01-29 DIAGNOSIS — M25.671 DECREASED RANGE OF MOTION OF RIGHT ANKLE: Primary | ICD-10-CM

## 2025-01-29 PROCEDURE — 97112 NEUROMUSCULAR REEDUCATION: CPT | Mod: PO,CQ

## 2025-01-29 PROCEDURE — 97110 THERAPEUTIC EXERCISES: CPT | Mod: PO,CQ

## 2025-01-29 NOTE — PROGRESS NOTES
Outpatient Rehab    Physical Therapy Visit    Patient Name: Aidee Larose  MRN: [id number]  YOB: 1965  Today's Date: 2025    Therapy Diagnosis:   Encounter Diagnoses   Name Primary?    Decreased range of motion of right ankle Yes    Weakness of both lower extremities      Physician: Chong Galaviz DPM    Physician Orders: Eval and Treat  Medical Diagnosis from Referral: Z98.890 (ICD-10-CM) - Post-operative state   Evaluation Date: 1/15/2025  Authorization Period Expiration: 2025  Plan of Care Expiration: 3/12/2025  Progress Note Due: 2/15/2025  Date of Surgery: 2024 R peroneal tendon repair  Visit # / Visits authorized: 3/20   FOTO: 1/ 3    Time In: 08   Time Out: 09  Total Time: 54   Total Billable Time: 54 minutes     Subjective   her ankle is doing well this morning. Patient states she is pleased with her progress so far..  Pain reported as 0.      Past Medical History/Physical Systems Review:   Aidee Larose  has a past medical history of Hypertension, Hypothyroidism, Left navicular fracture of foot, Miscarriage, PE (pulmonary embolism), PONV (postoperative nausea and vomiting), and Thyroid disease.    Aidee Larose  has a past surgical history that includes Tubal ligation;  section; Pelvic laparoscopy; Laser ablation; breast reduction; Hysterectomy (2013); Oophorectomy (2013); Belt abdominoplasty (2013); Carpal tunnel release (Bilateral,  and ); Total Reduction Mammoplasty (Bilateral, ); Colonoscopy (N/A, 2018); and Repair of tendon of lower extremity (Right, 2024).    Aidee has a current medication list which includes the following prescription(s): amlodipine, levothyroxine, multivitamin, oxycodone-acetaminophen, phentermine, phentermine, and valacyclovir.    Review of patient's allergies indicates:  No Known Allergies     Objective            Treatment:  Therapeutic  Exercise  Therapeutic Exercise Activity 1: Recumbent bike x 10 minutes  Therapeutic Exercise Activity 2: Standing gastroc stretch on incline x 1 minute x 2  Therapeutic Exercise Activity 3: Seated soleus stretch with foam under knee x 30 sec x 3  Therapeutic Exercise Activity 4: 4 way ankle (Red TB) 3x10  Therapeutic Exercise Activity 5: Towel curl drags c 5# weight x 3 towel lengths  Therapeutic Exercise Activity 6: Seated Heel Slide 30x  Therapeutic Exercise Activity 8: BLE shuttle squats 3x10, 62.5#     Balance/Neuromuscular Re-Education  Balance/Neuromuscular Re-Education Activity 1: Ankle circles CW/CCW 2x10  Balance/Neuromuscular Re-Education Activity 2: [place]  2x  Balance/Neuromuscular Re-Education Activity 3: NBOS on air ex pad: EO x 1 minute x 2  Balance/Neuromuscular Re-Education Activity 4: NBOS EC on ground x 30 sec x 2  Balance/Neuromuscular Re-Education Activity 5: Tandem stance 3 x 30 sec ea  Balance/Neuromuscular Re-Education Activity 6: SLS x 20 sec x 2 ea  Balance/Neuromuscular Re-Education Activity 7: S/L clamshell (green TB) 3x10, 3 sec hold  Balance/Neuromuscular Re-Education Activity 8: Standing hip abduction 2x10 (Cues for level pelvis and stability)             Patient's spiritual, cultural, and educational needs considered and patient agreeable to plan of care and goals.     Assessment & Plan   Assessment: Aidee able to maintain single limb stance bilaterally for 20 seconds without loss of balance. Good tolerance to shuttle squats with cues needed for awareness and correction of valgus collapse and arch control. Improved closed chain DF noted with heel slides.  Evaluation/Treatment Tolerance: Patient tolerated treatment well        Plan: Continue current POC with emphasis on impring ankle stability and neuromuscular control.    Goals:   Active       Physical Therapy       Physical Therapy Goal (Progressing)       Start:  01/24/25    Expected End:  03/12/25       Goals: Short Term  Goals: 5 weeks   1. Pt to report worst pain 1/10  2. Pt to improve R DF range of motion by 5 deg  3. Pt to improve BLE strength by 1/2 grade  4. Pt to improve FOTO by 10%     Long Term Goals: 10 weeks   5. Pt to report worst pain 0/10  6. Pt to improve R DF range of motion by 10 deg  7. Pt to improve BLE strength by 1 grade  8. Pt to improve FOTO by 20%               Kellie Broderick, PTA

## 2025-02-03 ENCOUNTER — CLINICAL SUPPORT (OUTPATIENT)
Dept: REHABILITATION | Facility: HOSPITAL | Age: 60
End: 2025-02-03
Payer: COMMERCIAL

## 2025-02-03 DIAGNOSIS — R29.898 WEAKNESS OF BOTH LOWER EXTREMITIES: ICD-10-CM

## 2025-02-03 DIAGNOSIS — M25.671 DECREASED RANGE OF MOTION OF RIGHT ANKLE: Primary | ICD-10-CM

## 2025-02-03 PROCEDURE — 97112 NEUROMUSCULAR REEDUCATION: CPT | Mod: PO,CQ

## 2025-02-03 PROCEDURE — 97110 THERAPEUTIC EXERCISES: CPT | Mod: PO,CQ

## 2025-02-03 NOTE — PROGRESS NOTES
Outpatient Rehab    Physical Therapy Visit    Patient Name: Aidee Larose  MRN: [id number]  YOB: 1965  Today's Date: 2/3/2025    Therapy Diagnosis:   Encounter Diagnoses   Name Primary?    Decreased range of motion of right ankle Yes    Weakness of both lower extremities      Physician: Chong Galaviz DPM    Physician Orders: Eval and Treat  Medical Diagnosis from Referral: Z98.890 (ICD-10-CM) - Post-operative state   Evaluation Date: 1/15/2025  Authorization Period Expiration: 2025  Plan of Care Expiration: 3/12/2025  Progress Note Due: 2/15/2025  Date of Surgery: 2024 R peroneal tendon repair  Visit # / Visits authorized:    FOTO: 1/ 3    Time In: 0900   Time Out:    Total Time:     Total Billable Time: 54 minutes     Subjective   Patient states her ankle has been going well. Patient states she did a lot of activity with her grandchildren over the weekend and did notice increased pain through midfoot by end of day yesterday..  Pain reported as 0.      Past Medical History/Physical Systems Review:   Aidee Larose  has a past medical history of Hypertension, Hypothyroidism, Left navicular fracture of foot, Miscarriage, PE (pulmonary embolism), PONV (postoperative nausea and vomiting), and Thyroid disease.    Aidee Larose  has a past surgical history that includes Tubal ligation;  section; Pelvic laparoscopy; Laser ablation; breast reduction; Hysterectomy (2013); Oophorectomy (2013); Belt abdominoplasty (2013); Carpal tunnel release (Bilateral,  and ); Total Reduction Mammoplasty (Bilateral, ); Colonoscopy (N/A, 2018); and Repair of tendon of lower extremity (Right, 2024).    Aidee has a current medication list which includes the following prescription(s): amlodipine, levothyroxine, multivitamin, oxycodone-acetaminophen, phentermine, phentermine, and valacyclovir.    Review of patient's allergies  indicates:  No Known Allergies     Objective            Treatment:  Therapeutic Exercise  Therapeutic Exercise Activity 1: Recumbent bike x 10 minutes  Therapeutic Exercise Activity 2: Standing gastroc stretch on incline x 1 minute x 2  Therapeutic Exercise Activity 3: Seated soleus stretch with foam under knee x 30 sec x 3  Therapeutic Exercise Activity 4: 4 way ankle (Red TB) 3x10  Therapeutic Exercise Activity 5: Towel curl drags c 5# weight x 3 towel lengths  Therapeutic Exercise Activity 6: Seated Heel Slide 30x  Therapeutic Exercise Activity 8: BLE shuttle squats 3x10, 62.5#  Therapeutic Exercise Activity 9: SL shuttle squats 37.5#     Balance/Neuromuscular Re-Education  Balance/Neuromuscular Re-Education Activity 1: Ankle circles CW/CCW 2x10  Balance/Neuromuscular Re-Education Activity 2: [place]  2x  Balance/Neuromuscular Re-Education Activity 3: NBOS on air ex pad: EO x 1 minute x 2  Balance/Neuromuscular Re-Education Activity 4: NBOS EC on air ex pad x 30 sec x 2  Balance/Neuromuscular Re-Education Activity 5: Tandem stance 3 x 30 sec ea  Balance/Neuromuscular Re-Education Activity 6: SLS x 20 sec x 2 ea  Balance/Neuromuscular Re-Education Activity 7: S/L clamshell (green TB) 3x10, 3 sec hold  Balance/Neuromuscular Re-Education Activity 8: Standing hip abduction 2x10 (Cues for level pelvis and stability)       Patient's spiritual, cultural, and educational needs considered and patient agreeable to plan of care and goals.     Assessment & Plan   Assessment: Aidee demonstrating good tolerance to progression of single limb strengthening on shuttle today. Good control of resisted inversion/eversion with LE compensation. Closed chain DF consistently improving.  Evaluation/Treatment Tolerance: Patient tolerated treatment well        Plan: Continue current POC with emphasis on improving ankle stability and neuromuscular control.    Goals:   Active       Physical Therapy       Physical Therapy Goal  (Progressing)       Start:  01/24/25    Expected End:  03/12/25       Goals: Short Term Goals: 5 weeks   1. Pt to report worst pain 1/10  2. Pt to improve R DF range of motion by 5 deg  3. Pt to improve BLE strength by 1/2 grade  4. Pt to improve FOTO by 10%     Long Term Goals: 10 weeks   5. Pt to report worst pain 0/10  6. Pt to improve R DF range of motion by 10 deg  7. Pt to improve BLE strength by 1 grade  8. Pt to improve FOTO by 20%               Kellie Broderick, PTA

## 2025-02-06 ENCOUNTER — CLINICAL SUPPORT (OUTPATIENT)
Dept: REHABILITATION | Facility: HOSPITAL | Age: 60
End: 2025-02-06
Payer: COMMERCIAL

## 2025-02-06 DIAGNOSIS — R29.898 WEAKNESS OF BOTH LOWER EXTREMITIES: ICD-10-CM

## 2025-02-06 DIAGNOSIS — M25.671 DECREASED RANGE OF MOTION OF RIGHT ANKLE: Primary | ICD-10-CM

## 2025-02-06 PROCEDURE — 97112 NEUROMUSCULAR REEDUCATION: CPT | Mod: PO

## 2025-02-06 PROCEDURE — 97110 THERAPEUTIC EXERCISES: CPT | Mod: PO

## 2025-02-06 PROCEDURE — 97140 MANUAL THERAPY 1/> REGIONS: CPT | Mod: PO

## 2025-02-06 NOTE — PROGRESS NOTES
"  Outpatient Rehab    Physical Therapy Visit    Patient Name: Aidee Larose  MRN: [id number]  YOB: 1965  Today's Date: 2/6/2025    Therapy Diagnosis:   Encounter Diagnoses   Name Primary?    Decreased range of motion of right ankle Yes    Weakness of both lower extremities      Physician: Chong Galaviz DPM    Physician Orders: Eval and Treat  Medical Diagnosis from Referral: Z98.890 (ICD-10-CM) - Post-operative state   Evaluation Date: 1/15/2025  Authorization Period Expiration: 12/31/2025  Plan of Care Expiration: 3/12/2025  Progress Note Due: 2/15/2025  Date of Surgery: 11/21/2024 R peroneal tendon repair  Visit # / Visits authorized: 5/20   FOTO: 1/ 3     Time In: 0800   Time Out: 0855  Total Time: 55 mins  Total Billable Time: 55 mins         Subjective   Pt states her foot feels good overall today. She reports she will be returning back to her doctor next week for a final check in..  Pain reported as 0/10.      Objective            Treatment:  Therapeutic Exercise  Therapeutic Exercise Activity 1: Recumbent bike x 10 minutes  Therapeutic Exercise Activity 2: Standing soleus stretch 3x30" ea  Therapeutic Exercise Activity 4: 4 way ankle (Red TB) 3x10  Therapeutic Exercise Activity 8: BLE shuttle squats 3x10, 62.5#  Therapeutic Exercise Activity 9: SL shuttle squats 37.5#  Therapeutic Exercise Activity 10: DL shuttle heel raises 3x10 62.5#    Manual Therapy  Manual Therapy Activity 1: Manual ray mobilization all rays to RLE x10 min    Balance/Neuromuscular Re-Education  Balance/Neuromuscular Re-Education Activity 3: NBOS on air ex pad: EO x 1 minute x 2  Balance/Neuromuscular Re-Education Activity 4: NBOS EC on air ex pad x 30 sec x 2  Balance/Neuromuscular Re-Education Activity 5: Tandem stance 3 x 30 sec ea on airex  Balance/Neuromuscular Re-Education Activity 6: Step to SLS on double airex 2x10 c 3 sec balance    Patient's spiritual, cultural, and educational needs considered and " patient agreeable to plan of care and goals.     Assessment & Plan   Assessment: Pt tolerated treatment well with no symptoms upon arrival or leaving clinic today. Overall, pt appears to be nearing baseline and will benefit from re-assessment at next visit to determine meeting of goals awith transition to HEP trial independently at home. Pt will continue to benefit from progressions at next session to maximize return of PLOF.  Evaluation/Treatment Tolerance: Patient tolerated treatment well        Plan: Continue POC with re-assessment at next visit    Goals:   Active       Physical Therapy       Physical Therapy Goal (Progressing)       Start:  01/24/25    Expected End:  03/12/25       Goals: Short Term Goals: 5 weeks   1. Pt to report worst pain 1/10  2. Pt to improve R DF range of motion by 5 deg  3. Pt to improve BLE strength by 1/2 grade  4. Pt to improve FOTO by 10%     Long Term Goals: 10 weeks   5. Pt to report worst pain 0/10  6. Pt to improve R DF range of motion by 10 deg  7. Pt to improve BLE strength by 1 grade  8. Pt to improve FOTO by 20%               Sridhar Smith, PT

## 2025-02-11 ENCOUNTER — OFFICE VISIT (OUTPATIENT)
Dept: PODIATRY | Facility: CLINIC | Age: 60
End: 2025-02-11
Payer: COMMERCIAL

## 2025-02-11 VITALS — WEIGHT: 184.06 LBS | HEIGHT: 59 IN | BODY MASS INDEX: 37.11 KG/M2

## 2025-02-11 DIAGNOSIS — Z98.890 POST-OPERATIVE STATE: Primary | ICD-10-CM

## 2025-02-11 PROCEDURE — 99999 PR PBB SHADOW E&M-EST. PATIENT-LVL III: CPT | Mod: PBBFAC,,, | Performed by: PODIATRIST

## 2025-02-11 NOTE — PROGRESS NOTES
Subjective:      Patient ID: Aidee Larose is a 59 y.o. female.    Chief Complaint: No chief complaint on file.    Aidee is a 59 y.o. female who presents to the podiatry clinic  with complaint of  right foot pain lateral ankle. Onset of the symptoms was  last summer over a year ago . Precipitating event: increased activity and overuse injury on her feet a lot . Current symptoms include: ability to bear weight, but with some pain, swelling, and worsening symptoms after a period of activity. Aggravating factors: any weight bearing. Symptoms have gradually worsened. Patient has had prior foot problems. Evaluation to date: MRI: abnormal peroneal tendon tear . Treatment to date:  tried PT twice, offloading in a boot and bracing . Patients rates pain 6/10 on pain scale.    11/26/24: Patient returns s/p 1 week right peroneal tendon repair. She returns in a posterior splint non weight bearing in a knee scooter. Relates minimal pain overall doing well    12/3/24: Patient returns s/p 2 weeks right peroneal tendon repair, she is in the cast non weight bearing    12/17/24: Patient returns s/p 4 weeks right peroneal tendon repair, she is in the boot weight bearing at home but still uses the scooter when out and about longer distances. Minimal pain or discomfort noted.    1/8/25: patient returns s/p 7 weeks peroneal tendon repair, she ambulates in the boot without pain. Doing well overall    2/11/25: patient returns s/p 3 months peroneal tendon repair, ambulating pain free in shoes, doing PT and feels tight at times but improving overall    Review of Systems   Constitutional: Negative for chills and fever.   Cardiovascular:  Negative for claudication and leg swelling.   Respiratory:  Negative for shortness of breath.    Skin:  Negative for itching, nail changes and rash.   Musculoskeletal:  Negative for muscle cramps, muscle weakness and myalgias.        Right ankle pain   Gastrointestinal:  Negative for nausea and  vomiting.   Neurological:  Negative for focal weakness, loss of balance, numbness and paresthesias.           Objective:      Physical Exam  Constitutional:       General: She is not in acute distress.     Appearance: She is well-developed. She is not diaphoretic.   Cardiovascular:      Pulses:           Dorsalis pedis pulses are 2+ on the right side and 2+ on the left side.        Posterior tibial pulses are 2+ on the right side and 2+ on the left side.      Comments: < 3 sec capillary refill time to toes 1-5 bilateral. Toes and feet are warm to touch proximally with normal distal cooling b/l. There is some hair growth on the feet and toes b/l. There is no edema b/l. No spider veins or varicosities present b/l.     Musculoskeletal:      Comments: Equinus noted b/l ankles with < 10 deg DF noted. MMT 5/5 in DF/PF/Inv/Ev resistance with no reproduction of pain in any direction. Passive range of motion of ankle and pedal joints is painless b/l.    No pain with palpation or ROM along the peroneal tendon or ankle ROM   Skin:     General: Skin is warm and dry.      Coloration: Skin is not pale.      Findings: No abrasion, bruising, burn, ecchymosis, erythema, laceration, lesion, petechiae or rash.      Nails: There is no clubbing.      Comments: Skin temperature, texture and turgor within normal limits.    Right lateral foot/ankle incision now well healed   Neurological:      Mental Status: She is alert and oriented to person, place, and time.      Sensory: No sensory deficit.      Motor: No tremor, atrophy or abnormal muscle tone.      Comments: Negative tinel sign bilateral.   Psychiatric:         Behavior: Behavior normal.             Assessment:       Encounter Diagnosis   Name Primary?    Post-operative state Yes                 Plan:       Diagnoses and all orders for this visit:    Post-operative state              I counseled the patient on her conditions, their implications and medical management.    Incision now  well healed    No pain to the foot with ROM or motion exercises    Continue in her shoes and released to full activity no restrictions    Return PRN if she feels she is not continuing to improve but doing well overall with PT     Chong Galaviz DPM

## 2025-02-18 ENCOUNTER — CLINICAL SUPPORT (OUTPATIENT)
Dept: REHABILITATION | Facility: HOSPITAL | Age: 60
End: 2025-02-18
Payer: COMMERCIAL

## 2025-02-18 DIAGNOSIS — R29.898 WEAKNESS OF BOTH LOWER EXTREMITIES: ICD-10-CM

## 2025-02-18 DIAGNOSIS — M25.671 DECREASED RANGE OF MOTION OF RIGHT ANKLE: Primary | ICD-10-CM

## 2025-02-18 PROCEDURE — 97112 NEUROMUSCULAR REEDUCATION: CPT | Mod: PO

## 2025-02-18 PROCEDURE — 97110 THERAPEUTIC EXERCISES: CPT | Mod: PO

## 2025-02-18 NOTE — PROGRESS NOTES
"  Outpatient Rehab    Physical Therapy Visit    Patient Name: Aidee Larose  MRN: [id number]  YOB: 1965  Today's Date: 2/18/2025    Therapy Diagnosis:   Encounter Diagnoses   Name Primary?    Decreased range of motion of right ankle Yes    Weakness of both lower extremities        Physician: Chong Galaviz DPM    Physician Orders: Eval and Treat  Medical Diagnosis from Referral: Z98.890 (ICD-10-CM) - Post-operative state   Evaluation Date: 1/15/2025  Authorization Period Expiration: 12/31/2025  Plan of Care Expiration: 3/12/2025  Progress Note Due: 2/15/2025  Date of Surgery: 11/21/2024 R peroneal tendon repair  Visit # / Visits authorized: 5/20   FOTO: 1/ 3     Time In: 0800   Time Out: 0845  Total Time: 45 mins  Total Billable Time: 45 mins         Subjective   Pt states she feels great and would like to discharge.  Pain reported as 0/10.      Objective            Treatment:  Therapeutic Exercise  Therapeutic Exercise Activity 1: Recumbent bike x 10 minutes  Therapeutic Exercise Activity 2: Standing soleus stretch 3x30" ea  Therapeutic Exercise Activity 4: 4 way ankle (Red TB) 3x10         Balance/Neuromuscular Re-Education  Balance/Neuromuscular Re-Education Activity 3: NBOS on air ex pad: EO x 1 minute x 2  Balance/Neuromuscular Re-Education Activity 4: NBOS EC on air ex pad x 30 sec x 2  Balance/Neuromuscular Re-Education Activity 5: Tandem stance 3 x 30 sec ea on airex  Balance/Neuromuscular Re-Education Activity 6: Step to SLS on double airex 2x10 c 3 sec balance  Balance/Neuromuscular Re-Education Activity 8: Standing hip abduction 3x10 GTB  Balance/Neuromuscular Re-Education Activity 9: Mini squats 3x10    Patient's spiritual, cultural, and educational needs considered and patient agreeable to plan of care and goals.     Assessment & Plan   Assessment: Pt noted to have returned to baseline at this time and no longer presents with finctional impairemnts limiting performance of " ADLs. Due to this, pt was educated on a final d/c plan and demonstrated good overall understanding. Pt will discharge at this time and will f/u if sx return in the next 30 days.  Evaluation/Treatment Tolerance: Patient tolerated treatment well        Plan: Discharge today    Goals:   Resolved       Physical Therapy       Physical Therapy Goal (Met)       Start:  01/24/25    Expected End:  03/12/25    Resolved:  02/18/25    Goals: Short Term Goals: 5 weeks   1. Pt to report worst pain 1/10  2. Pt to improve R DF range of motion by 5 deg  3. Pt to improve BLE strength by 1/2 grade  4. Pt to improve FOTO by 10%     Long Term Goals: 10 weeks   5. Pt to report worst pain 0/10  6. Pt to improve R DF range of motion by 10 deg  7. Pt to improve BLE strength by 1 grade  8. Pt to improve FOTO by 20%               Sridhar Smith, PT

## 2025-08-27 ENCOUNTER — OFFICE VISIT (OUTPATIENT)
Dept: FAMILY MEDICINE | Facility: CLINIC | Age: 60
End: 2025-08-27
Payer: COMMERCIAL

## 2025-08-27 VITALS
HEIGHT: 59 IN | DIASTOLIC BLOOD PRESSURE: 76 MMHG | SYSTOLIC BLOOD PRESSURE: 126 MMHG | WEIGHT: 180.25 LBS | HEART RATE: 85 BPM | OXYGEN SATURATION: 99 % | BODY MASS INDEX: 36.34 KG/M2

## 2025-08-27 DIAGNOSIS — I10 ESSENTIAL HYPERTENSION: ICD-10-CM

## 2025-08-27 DIAGNOSIS — B00.1 FEVER BLISTER: ICD-10-CM

## 2025-08-27 DIAGNOSIS — E66.01 SEVERE OBESITY (BMI 35.0-39.9) WITH COMORBIDITY: ICD-10-CM

## 2025-08-27 DIAGNOSIS — E03.9 ACQUIRED HYPOTHYROIDISM: ICD-10-CM

## 2025-08-27 DIAGNOSIS — Z00.00 WELLNESS EXAMINATION: Primary | ICD-10-CM

## 2025-08-27 PROCEDURE — 99999 PR PBB SHADOW E&M-EST. PATIENT-LVL III: CPT | Mod: PBBFAC,,, | Performed by: FAMILY MEDICINE

## 2025-08-27 RX ORDER — AMLODIPINE BESYLATE 5 MG/1
5 TABLET ORAL DAILY
Qty: 90 TABLET | Refills: 3 | Status: SHIPPED | OUTPATIENT
Start: 2025-08-27

## 2025-08-27 RX ORDER — VALACYCLOVIR HYDROCHLORIDE 1 G/1
2000 TABLET, FILM COATED ORAL 2 TIMES DAILY PRN
Qty: 30 TABLET | Refills: 11 | Status: SHIPPED | OUTPATIENT
Start: 2025-08-27 | End: 2026-02-23

## 2025-08-27 RX ORDER — LEVOTHYROXINE SODIUM 50 UG/1
50 TABLET ORAL
Qty: 90 TABLET | Refills: 3 | Status: SHIPPED | OUTPATIENT
Start: 2025-08-27

## (undated) DEVICE — DRESSING XEROFORM NONADH 1X8IN

## (undated) DEVICE — STRAP OR TABLE 5IN X 72IN

## (undated) DEVICE — BLADE SURG #15 CARBON STEEL

## (undated) DEVICE — STOCKINETTE SGL PLY 6X48IN

## (undated) DEVICE — SUT 2-0 VICRYL / SH (J417)

## (undated) DEVICE — PAD CAST SPECIALIST STRL 4

## (undated) DEVICE — GLOVE SENSICARE PI GRN 7.5

## (undated) DEVICE — PENCIL ROCKER SWITCH 10FT CORD

## (undated) DEVICE — APPLICATOR CHLORAPREP ORN 26ML

## (undated) DEVICE — Device

## (undated) DEVICE — TOURNIQUET SB QC DP 30X4IN

## (undated) DEVICE — KIT SAHARA DRAPE DRAW/LIFT

## (undated) DEVICE — DRAPE THREE-QTR REINF 53X77IN

## (undated) DEVICE — SUT 5/0 18IN COATED VICRYL

## (undated) DEVICE — TUBING SUC UNIV W/CONN 12FT

## (undated) DEVICE — ELECTRODE REM PLYHSV RETURN 9

## (undated) DEVICE — SUT 2/0 36IN ETHIBOND EXCE

## (undated) DEVICE — SYR 10CC LUER LOCK

## (undated) DEVICE — SUT 4-0 VICRYL / SH

## (undated) DEVICE — SUT 4-0 ETHILON 18 PS-2

## (undated) DEVICE — SUT 3-0 VICRYL / SH (J416)

## (undated) DEVICE — SUT ET GRN BR 3-0 CR 36 SH1

## (undated) DEVICE — SPONGE COTTON TRAY 4X4IN

## (undated) DEVICE — HANDLE SURG LIGHT NONRIGID

## (undated) DEVICE — NDL HYPO REG 25G X 1 1/2

## (undated) DEVICE — SUT 5-0 VICRYL / P-3 (J493)

## (undated) DEVICE — DRAPE T EXTRM SURG 121X128X90

## (undated) DEVICE — SOL IRR SOD CHL .9% POUR

## (undated) DEVICE — SPLINT PLASTER FAST SET 5X30IN

## (undated) DEVICE — PAD ABDOMINAL STERILE 5X9IN

## (undated) DEVICE — STOCKINET 4INX48

## (undated) DEVICE — BANDAGE ESMARK LATEX FREE 4INX

## (undated) DEVICE — SUT PROLENE 4-0 PS2 18 BLUE

## (undated) DEVICE — SUT ETHILON 3-0 PS2 18 BLK

## (undated) DEVICE — STAPLER SKIN ROTATING HEAD

## (undated) DEVICE — GLOVE SENSICARE PI MICRO 7